# Patient Record
Sex: MALE | Race: WHITE | NOT HISPANIC OR LATINO
[De-identification: names, ages, dates, MRNs, and addresses within clinical notes are randomized per-mention and may not be internally consistent; named-entity substitution may affect disease eponyms.]

---

## 2019-01-09 ENCOUNTER — FORM ENCOUNTER (OUTPATIENT)
Age: 69
End: 2019-01-09

## 2019-01-10 ENCOUNTER — OUTPATIENT (OUTPATIENT)
Dept: OUTPATIENT SERVICES | Facility: HOSPITAL | Age: 69
LOS: 1 days | End: 2019-01-10
Payer: MEDICARE

## 2019-01-10 ENCOUNTER — APPOINTMENT (OUTPATIENT)
Dept: ORTHOPEDIC SURGERY | Facility: CLINIC | Age: 69
End: 2019-01-10
Payer: MEDICARE

## 2019-01-10 VITALS
BODY MASS INDEX: 35.82 KG/M2 | DIASTOLIC BLOOD PRESSURE: 70 MMHG | HEIGHT: 65 IN | WEIGHT: 215 LBS | OXYGEN SATURATION: 98 % | SYSTOLIC BLOOD PRESSURE: 140 MMHG | HEART RATE: 90 BPM

## 2019-01-10 DIAGNOSIS — I61.9 NONTRAUMATIC INTRACEREBRAL HEMORRHAGE, UNSPECIFIED: ICD-10-CM

## 2019-01-10 DIAGNOSIS — Z85.46 PERSONAL HISTORY OF MALIGNANT NEOPLASM OF PROSTATE: ICD-10-CM

## 2019-01-10 DIAGNOSIS — Z01.818 ENCOUNTER FOR OTHER PREPROCEDURAL EXAMINATION: ICD-10-CM

## 2019-01-10 DIAGNOSIS — Z78.9 OTHER SPECIFIED HEALTH STATUS: ICD-10-CM

## 2019-01-10 LAB
ANION GAP SERPL CALC-SCNC: 17 MMOL/L — SIGNIFICANT CHANGE UP (ref 5–17)
APPEARANCE UR: CLEAR — SIGNIFICANT CHANGE UP
APTT BLD: 31.6 SEC — SIGNIFICANT CHANGE UP (ref 27.5–36.3)
BACTERIA # UR AUTO: PRESENT /HPF
BILIRUB UR-MCNC: NEGATIVE — SIGNIFICANT CHANGE UP
BUN SERPL-MCNC: 15 MG/DL — SIGNIFICANT CHANGE UP (ref 7–23)
CALCIUM SERPL-MCNC: 9.7 MG/DL — SIGNIFICANT CHANGE UP (ref 8.4–10.5)
CHLORIDE SERPL-SCNC: 97 MMOL/L — SIGNIFICANT CHANGE UP (ref 96–108)
CO2 SERPL-SCNC: 25 MMOL/L — SIGNIFICANT CHANGE UP (ref 22–31)
COLOR SPEC: YELLOW — SIGNIFICANT CHANGE UP
COMMENT - URINE: SIGNIFICANT CHANGE UP
CREAT SERPL-MCNC: 0.73 MG/DL — SIGNIFICANT CHANGE UP (ref 0.5–1.3)
DIFF PNL FLD: NEGATIVE — SIGNIFICANT CHANGE UP
EPI CELLS # UR: SIGNIFICANT CHANGE UP /HPF (ref 0–5)
GLUCOSE SERPL-MCNC: 105 MG/DL — HIGH (ref 70–99)
GLUCOSE UR QL: NEGATIVE — SIGNIFICANT CHANGE UP
HBA1C BLD-MCNC: 5 % — SIGNIFICANT CHANGE UP (ref 4–5.6)
HCT VFR BLD CALC: 46.4 % — SIGNIFICANT CHANGE UP (ref 39–50)
HGB BLD-MCNC: 15.9 G/DL — SIGNIFICANT CHANGE UP (ref 13–17)
INR BLD: 0.99 — SIGNIFICANT CHANGE UP (ref 0.88–1.16)
KETONES UR-MCNC: 15 MG/DL
LEUKOCYTE ESTERASE UR-ACNC: ABNORMAL
MCHC RBC-ENTMCNC: 29.6 PG — SIGNIFICANT CHANGE UP (ref 27–34)
MCHC RBC-ENTMCNC: 34.3 G/DL — SIGNIFICANT CHANGE UP (ref 32–36)
MCV RBC AUTO: 86.4 FL — SIGNIFICANT CHANGE UP (ref 80–100)
NITRITE UR-MCNC: NEGATIVE — SIGNIFICANT CHANGE UP
PH UR: 6 — SIGNIFICANT CHANGE UP (ref 5–8)
PLATELET # BLD AUTO: 263 K/UL — SIGNIFICANT CHANGE UP (ref 150–400)
POTASSIUM SERPL-MCNC: 4.9 MMOL/L — SIGNIFICANT CHANGE UP (ref 3.5–5.3)
POTASSIUM SERPL-SCNC: 4.9 MMOL/L — SIGNIFICANT CHANGE UP (ref 3.5–5.3)
PROT UR-MCNC: NEGATIVE MG/DL — SIGNIFICANT CHANGE UP
PROTHROM AB SERPL-ACNC: 11.2 SEC — SIGNIFICANT CHANGE UP (ref 10–12.9)
RBC # BLD: 5.37 M/UL — SIGNIFICANT CHANGE UP (ref 4.2–5.8)
RBC # FLD: 13.5 % — SIGNIFICANT CHANGE UP (ref 10.3–16.9)
RBC CASTS # UR COMP ASSIST: < 5 /HPF — SIGNIFICANT CHANGE UP
SODIUM SERPL-SCNC: 139 MMOL/L — SIGNIFICANT CHANGE UP (ref 135–145)
SP GR SPEC: 1.02 — SIGNIFICANT CHANGE UP (ref 1–1.03)
UROBILINOGEN FLD QL: 0.2 E.U./DL — SIGNIFICANT CHANGE UP
WBC # BLD: 8.2 K/UL — SIGNIFICANT CHANGE UP (ref 3.8–10.5)
WBC # FLD AUTO: 8.2 K/UL — SIGNIFICANT CHANGE UP (ref 3.8–10.5)
WBC UR QL: ABNORMAL /HPF

## 2019-01-10 PROCEDURE — 72170 X-RAY EXAM OF PELVIS: CPT | Mod: 26

## 2019-01-10 PROCEDURE — 73564 X-RAY EXAM KNEE 4 OR MORE: CPT

## 2019-01-10 PROCEDURE — 71046 X-RAY EXAM CHEST 2 VIEWS: CPT

## 2019-01-10 PROCEDURE — 71046 X-RAY EXAM CHEST 2 VIEWS: CPT | Mod: 26

## 2019-01-10 PROCEDURE — 85027 COMPLETE CBC AUTOMATED: CPT

## 2019-01-10 PROCEDURE — 87086 URINE CULTURE/COLONY COUNT: CPT

## 2019-01-10 PROCEDURE — 93005 ELECTROCARDIOGRAM TRACING: CPT

## 2019-01-10 PROCEDURE — 80048 BASIC METABOLIC PNL TOTAL CA: CPT

## 2019-01-10 PROCEDURE — 81001 URINALYSIS AUTO W/SCOPE: CPT

## 2019-01-10 PROCEDURE — 85610 PROTHROMBIN TIME: CPT

## 2019-01-10 PROCEDURE — 73564 X-RAY EXAM KNEE 4 OR MORE: CPT | Mod: 26,50

## 2019-01-10 PROCEDURE — 85730 THROMBOPLASTIN TIME PARTIAL: CPT

## 2019-01-10 PROCEDURE — 99204 OFFICE O/P NEW MOD 45 MIN: CPT

## 2019-01-10 PROCEDURE — 83036 HEMOGLOBIN GLYCOSYLATED A1C: CPT

## 2019-01-10 PROCEDURE — 93010 ELECTROCARDIOGRAM REPORT: CPT

## 2019-01-10 PROCEDURE — 72170 X-RAY EXAM OF PELVIS: CPT

## 2019-01-10 RX ORDER — IBUPROFEN 800 MG/1
TABLET, FILM COATED ORAL
Refills: 0 | Status: ACTIVE | COMMUNITY

## 2019-01-10 RX ORDER — LISINOPRIL 30 MG/1
TABLET ORAL
Refills: 0 | Status: ACTIVE | COMMUNITY

## 2019-01-10 RX ORDER — CLONAZEPAM 2 MG/1
TABLET ORAL
Refills: 0 | Status: ACTIVE | COMMUNITY

## 2019-01-10 NOTE — HISTORY OF PRESENT ILLNESS
[Worsening] : worsening [___ yrs] : [unfilled] year(s) ago [Standing] : standing [Constant] : ~He/She~ states the symptoms seem to be constant [Bending] : worsened by bending [Sitting] : worsened by sitting [Walking] : worsened by walking [None] : No relieving factors are noted [de-identified] : 69 y/o M with HTN presents today for bilateral knee pain, pain in right > left. He reports that his right knee pain is severe, began in 2015, and is exacerbated by walking and stairs He is able to walk less than 5 blocks and up and down stairs with a rail. He reports mild bilateral knee stiffness and feeling unstable on uneven ground. Patient uses a cane to ambulate. He has tried Naproxen with no relief, acetaminophen with minimal relief and ibuprofen with moderate relief from pain. He reports that physical therapy made his symptoms worse and that he's had cortisone and gel injections which didn't help either. \par Of note, patient had some bleeding in the brain a year ago but reports that doctors at [facility] informed him it was no tumor. He was told to avoid aspirin for several months and then was told he could take it again by patient report.  He also has a PMHx of kidney and prostate cancer and was treated with surgery and radiation. [Acetaminophen] : not relieved by acetaminophen [NSAIDs] : not relieved by nonsteroidal anti-inflammatory drugs [Physical Therapy] : not relieved by physical therapy

## 2019-01-10 NOTE — PHYSICAL EXAM
[de-identified] : Constitutional: Well appearing. No acute distress.\par Mental Status: Alert & oriented to person, place and time. Normal affect.\par Pulmonary: No respiratory distress. Normal chest excursion.\par \par Gait: Antalgic\par Ambulatory assist devices: Cane.\par \par Cervical spine: Skin intact. No visible deformity. Painless active ROM without evident restriction.\par Bilateral upper extremities: Skin intact. No deformity. Painless active ROM without evident restriction.\par Thoracolumbar spine: No deformity. No tenderness. No radicular pain on passive straight leg raise bilaterally.\par Pelvis: No pelvic obliquity. No tenderness.\par Leg lengths: Similar \par \par Bilateral Hips: No swelling or deformity. No focal tenderness. Painless and unrestricted range of motion. \par \par Right Knee:\par Skin intact.\par No surgical scars.\par No erythema or ecchymosis.\par No swelling or effusion.\par Varus deformity.\par Medial joint line tenderness.\par Painless and restricted range of motion. ROM from 5-10 degrees to 120 degrees of flexion.\par Central patellar tracking.\par (+) crepitation.\par No instability.\par 2+ pedal pulse\par \par Left Knee:\par Skin intact.\par No surgical scars.\par No erythema or ecchymosis.\par No swelling or effusion.\par Slight, correctable varus deformity.\par Minimal tenderness.\par Painless and restricted range of motion. ROM from <5 degrees to 120 degrees of flexion.\par Central patellar tracking.\par (+) crepitation.\par No instability.\par 1+ edema at ankle level\par \par Neurological: Intact distal crude touch sensation. Normal distal motor power. \par Cardiovascular: Palpable dorsalis pedis and posterior tibialis pulses. Brisk capillary refill. No peripheral edema.\par Lymphatics: No peripheral adenopathy appreciated. [de-identified] : X-ray imaging of the bilateral knees taken here today shows severe osteoarthritis of both knees, bone on bone in medial tibiofemoral compartment, more severe on the right than the left, significant patellofemoral involvement, bone on bone bilaterally, and lesser degenerative changes in the lateral compartment bilaterally.\par Imaging of the AP pelvis showed minimal arthritis of the hips. \par \par \par \par

## 2019-01-10 NOTE — ADDENDUM
[FreeTextEntry1] : This note was written by Rosalee Luong on 01/10/2019 acting as scribe for Dr. Coker and NAIMA Navarro.\par \par \par \par

## 2019-01-12 LAB
ANABASINE UR-MCNC: <2 NG/ML — SIGNIFICANT CHANGE UP
COTININE UR-MCNC: <5 NG/ML — SIGNIFICANT CHANGE UP
CULTURE RESULTS: SIGNIFICANT CHANGE UP
NICOTINE UR-MCNC: <5 NG/ML — SIGNIFICANT CHANGE UP
NORNICOTINE UR-MCNC: <2 NG/ML — SIGNIFICANT CHANGE UP
SPECIMEN SOURCE: SIGNIFICANT CHANGE UP

## 2019-01-15 ENCOUNTER — FORM ENCOUNTER (OUTPATIENT)
Age: 69
End: 2019-01-15

## 2019-01-16 ENCOUNTER — OUTPATIENT (OUTPATIENT)
Dept: OUTPATIENT SERVICES | Facility: HOSPITAL | Age: 69
LOS: 1 days | End: 2019-01-16
Payer: MEDICARE

## 2019-01-16 ENCOUNTER — OUTPATIENT (OUTPATIENT)
Dept: OUTPATIENT SERVICES | Facility: HOSPITAL | Age: 69
LOS: 1 days | End: 2019-01-16
Payer: COMMERCIAL

## 2019-01-16 ENCOUNTER — APPOINTMENT (OUTPATIENT)
Dept: CT IMAGING | Facility: HOSPITAL | Age: 69
End: 2019-01-16
Payer: MEDICARE

## 2019-01-16 DIAGNOSIS — Z22.321 CARRIER OR SUSPECTED CARRIER OF METHICILLIN SUSCEPTIBLE STAPHYLOCOCCUS AUREUS: ICD-10-CM

## 2019-01-16 LAB
MRSA PCR RESULT.: NEGATIVE — SIGNIFICANT CHANGE UP
S AUREUS DNA NOSE QL NAA+PROBE: NEGATIVE — SIGNIFICANT CHANGE UP

## 2019-01-16 PROCEDURE — 87641 MR-STAPH DNA AMP PROBE: CPT

## 2019-01-16 PROCEDURE — 73700 CT LOWER EXTREMITY W/O DYE: CPT

## 2019-01-16 PROCEDURE — 73700 CT LOWER EXTREMITY W/O DYE: CPT | Mod: 26,RT

## 2019-01-27 ENCOUNTER — TRANSCRIPTION ENCOUNTER (OUTPATIENT)
Age: 69
End: 2019-01-27

## 2019-01-29 ENCOUNTER — APPOINTMENT (OUTPATIENT)
Dept: INTERNAL MEDICINE | Facility: CLINIC | Age: 69
End: 2019-01-29
Payer: MEDICARE

## 2019-01-29 VITALS
DIASTOLIC BLOOD PRESSURE: 89 MMHG | TEMPERATURE: 97.9 F | SYSTOLIC BLOOD PRESSURE: 130 MMHG | WEIGHT: 220 LBS | HEART RATE: 89 BPM | RESPIRATION RATE: 14 BRPM | OXYGEN SATURATION: 97 % | BODY MASS INDEX: 36.61 KG/M2

## 2019-01-29 DIAGNOSIS — Z85.528 PERSONAL HISTORY OF OTHER MALIGNANT NEOPLASM OF KIDNEY: ICD-10-CM

## 2019-01-29 DIAGNOSIS — Z00.00 ENCOUNTER FOR GENERAL ADULT MEDICAL EXAMINATION W/OUT ABNORMAL FINDINGS: ICD-10-CM

## 2019-01-29 PROCEDURE — 99204 OFFICE O/P NEW MOD 45 MIN: CPT

## 2019-01-29 RX ORDER — LISINOPRIL AND HYDROCHLOROTHIAZIDE TABLETS 20; 12.5 MG/1; MG/1
20-12.5 TABLET ORAL
Refills: 0 | Status: ACTIVE | COMMUNITY

## 2019-01-29 NOTE — REVIEW OF SYSTEMS
[Fever] : no fever [Chills] : no chills [Recent Change In Weight] : ~T recent weight change [Joint Pain] : joint pain [Insomnia] : insomnia [Negative] : Heme/Lymph

## 2019-01-29 NOTE — HISTORY OF PRESENT ILLNESS
[No Pertinent Cardiac History] : no history of aortic stenosis, atrial fibrillation, coronary artery disease, recent myocardial infarction, or implantable device/pacemaker [No Pertinent Pulmonary History] : no history of asthma, COPD, sleep apnea, or smoking [FreeTextEntry1] : right tkr [FreeTextEntry2] : 2/6/19 [FreeTextEntry3] : Dr Coker [FreeTextEntry4] : This is a 69 yo M with hx of HTN, prosate CA, Renal mass s/p partial nephrectomy\par Knee pain for 5 years - failed conservative therapy\par Hx of penile surgery- always with contaminated urine.\par Reports a hx of cerebellar hemorrhage- no trauma, resolved on its own\par Insomnia-  takes klonopin for this

## 2019-01-29 NOTE — PHYSICAL EXAM
[No Acute Distress] : no acute distress [Well Nourished] : well nourished [Well Developed] : well developed [Well-Appearing] : well-appearing [Normal Outer Ear/Nose] : the outer ears and nose were normal in appearance [Normal Oropharynx] : the oropharynx was normal [No JVD] : no jugular venous distention [Supple] : supple [No Lymphadenopathy] : no lymphadenopathy [Thyroid Normal, No Nodules] : the thyroid was normal and there were no nodules present [No Respiratory Distress] : no respiratory distress  [Clear to Auscultation] : lungs were clear to auscultation bilaterally [No Accessory Muscle Use] : no accessory muscle use [Normal Rate] : normal rate  [Regular Rhythm] : with a regular rhythm [Normal S1, S2] : normal S1 and S2 [No Edema] : there was no peripheral edema [Soft] : abdomen soft [Non Tender] : non-tender [Non-distended] : non-distended [No Masses] : no abdominal mass palpated [No HSM] : no HSM [Normal Bowel Sounds] : normal bowel sounds [Normal Posterior Cervical Nodes] : no posterior cervical lymphadenopathy [Normal Anterior Cervical Nodes] : no anterior cervical lymphadenopathy [No Rash] : no rash [Normal Gait] : normal gait [No Focal Deficits] : no focal deficits [Deep Tendon Reflexes (DTR)] : deep tendon reflexes were 2+ and symmetric [Normal Affect] : the affect was normal [Normal Insight/Judgement] : insight and judgment were intact [de-identified] : 3/6 suzanne

## 2019-02-01 VITALS
HEIGHT: 66 IN | WEIGHT: 221.79 LBS | TEMPERATURE: 98 F | DIASTOLIC BLOOD PRESSURE: 83 MMHG | OXYGEN SATURATION: 98 % | RESPIRATION RATE: 16 BRPM | SYSTOLIC BLOOD PRESSURE: 131 MMHG | HEART RATE: 84 BPM

## 2019-02-01 NOTE — H&P ADULT - NSHPLABSRESULTS_GEN_ALL_CORE
Preop cbc, bmp, pt/inr, ptt, ua wnl; nasal swab neg  preop cxr wnl per clearance  preop ekg sinus tach rate 101 per clearance

## 2019-02-01 NOTE — H&P ADULT - NSHPPHYSICALEXAM_GEN_ALL_CORE
Right knee decreased rom 2/2 pain  Rest of PE per MD clearance Gen: NAD  MSK: No deformity or open wounds. No rashes or lesions. EHL/TA/GS/FHL 5/5 BLE. Sensation intact and equal BLE. Skin warm and well perfused. DP palpable BLE. Right knee decreased rom 2/2 pain.  Rest of PE per MD clearance

## 2019-02-01 NOTE — H&P ADULT - HISTORY OF PRESENT ILLNESS
68M c/o right knee pain  Presents today for elective right robotic TKR. 68M c/o right knee pain x 5 years. The patient denies preceding accident/injury or trauma to the knee and states that his symptoms are due to arthritis. The patient states that he has been ambulating with a cane lately. He complains of pain localized to the right knee and denies radiation of pain or numbness/tingling in the lower extremities. The patient failed conservative therapies for his symptoms including physical therapy. The patient feels well today and denies fever/chills/chest pain/shortness of breath. Denies hx of DVT.    Presents today for elective right TKR, robotic assisted.

## 2019-02-01 NOTE — H&P ADULT - PMH
Heart Murmur    Hiatal Hernia    Malignant neoplasm of kidney    Nontraumatic intracerebral hemorrhage    OA (osteoarthritis)    Personal History of Hypertensi    Prostate cancer

## 2019-02-01 NOTE — PATIENT PROFILE ADULT - NSPROEDALEARNPREF_GEN_A_NUR
written material/verbal instruction/individual instruction verbal instruction/skill demonstration/individual instruction/written material

## 2019-02-02 LAB — BACTERIA THROAT CULT: NORMAL

## 2019-02-04 ENCOUNTER — RESULT REVIEW (OUTPATIENT)
Age: 69
End: 2019-02-04

## 2019-02-04 ENCOUNTER — APPOINTMENT (OUTPATIENT)
Dept: ORTHOPEDIC SURGERY | Facility: HOSPITAL | Age: 69
End: 2019-02-04

## 2019-02-04 ENCOUNTER — INPATIENT (INPATIENT)
Facility: HOSPITAL | Age: 69
LOS: 1 days | Discharge: HOME HEALTH | DRG: 470 | End: 2019-02-06
Attending: ORTHOPAEDIC SURGERY | Admitting: ORTHOPAEDIC SURGERY
Payer: MEDICARE

## 2019-02-04 ENCOUNTER — MEDICATION RENEWAL (OUTPATIENT)
Age: 69
End: 2019-02-04

## 2019-02-04 DIAGNOSIS — Z96.0 PRESENCE OF UROGENITAL IMPLANTS: Chronic | ICD-10-CM

## 2019-02-04 DIAGNOSIS — Z90.5 ACQUIRED ABSENCE OF KIDNEY: Chronic | ICD-10-CM

## 2019-02-04 DIAGNOSIS — M17.11 UNILATERAL PRIMARY OSTEOARTHRITIS, RIGHT KNEE: ICD-10-CM

## 2019-02-04 LAB
HCT VFR BLD CALC: 39.6 % — SIGNIFICANT CHANGE UP (ref 39–50)
HGB BLD-MCNC: 13.5 G/DL — SIGNIFICANT CHANGE UP (ref 13–17)
MCHC RBC-ENTMCNC: 29.7 PG — SIGNIFICANT CHANGE UP (ref 27–34)
MCHC RBC-ENTMCNC: 34.1 G/DL — SIGNIFICANT CHANGE UP (ref 32–36)
MCV RBC AUTO: 87 FL — SIGNIFICANT CHANGE UP (ref 80–100)
PLATELET # BLD AUTO: 208 K/UL — SIGNIFICANT CHANGE UP (ref 150–400)
RBC # BLD: 4.55 M/UL — SIGNIFICANT CHANGE UP (ref 4.2–5.8)
RBC # FLD: 13.6 % — SIGNIFICANT CHANGE UP (ref 10.3–16.9)
WBC # BLD: 6.4 K/UL — SIGNIFICANT CHANGE UP (ref 3.8–10.5)
WBC # FLD AUTO: 6.4 K/UL — SIGNIFICANT CHANGE UP (ref 3.8–10.5)

## 2019-02-04 PROCEDURE — 27447 TOTAL KNEE ARTHROPLASTY: CPT | Mod: RT

## 2019-02-04 PROCEDURE — 73560 X-RAY EXAM OF KNEE 1 OR 2: CPT | Mod: 26,RT

## 2019-02-04 PROCEDURE — 20985 CPTR-ASST DIR MS PX: CPT

## 2019-02-04 RX ORDER — HYDROMORPHONE HYDROCHLORIDE 2 MG/ML
0.5 INJECTION INTRAMUSCULAR; INTRAVENOUS; SUBCUTANEOUS
Qty: 0 | Refills: 0 | Status: DISCONTINUED | OUTPATIENT
Start: 2019-02-04 | End: 2019-02-06

## 2019-02-04 RX ORDER — OXYCODONE HYDROCHLORIDE 5 MG/1
10 TABLET ORAL EVERY 4 HOURS
Qty: 0 | Refills: 0 | Status: DISCONTINUED | OUTPATIENT
Start: 2019-02-04 | End: 2019-02-06

## 2019-02-04 RX ORDER — HYDROCHLOROTHIAZIDE 25 MG
12.5 TABLET ORAL DAILY
Qty: 0 | Refills: 0 | Status: DISCONTINUED | OUTPATIENT
Start: 2019-02-04 | End: 2019-02-06

## 2019-02-04 RX ORDER — DOCUSATE SODIUM 100 MG
100 CAPSULE ORAL THREE TIMES A DAY
Qty: 0 | Refills: 0 | Status: DISCONTINUED | OUTPATIENT
Start: 2019-02-04 | End: 2019-02-06

## 2019-02-04 RX ORDER — CLONAZEPAM 1 MG
0.5 TABLET ORAL DAILY
Qty: 0 | Refills: 0 | Status: DISCONTINUED | OUTPATIENT
Start: 2019-02-04 | End: 2019-02-06

## 2019-02-04 RX ORDER — MAGNESIUM HYDROXIDE 400 MG/1
30 TABLET, CHEWABLE ORAL DAILY
Qty: 0 | Refills: 0 | Status: DISCONTINUED | OUTPATIENT
Start: 2019-02-04 | End: 2019-02-06

## 2019-02-04 RX ORDER — CEFAZOLIN SODIUM 1 G
2000 VIAL (EA) INJECTION EVERY 8 HOURS
Qty: 0 | Refills: 0 | Status: COMPLETED | OUTPATIENT
Start: 2019-02-04 | End: 2019-02-05

## 2019-02-04 RX ORDER — RALTEGRAVIR 400 MG/1
400 TABLET, FILM COATED ORAL
Qty: 0 | Refills: 0 | Status: DISCONTINUED | OUTPATIENT
Start: 2019-02-04 | End: 2019-02-04

## 2019-02-04 RX ORDER — ONDANSETRON 8 MG/1
4 TABLET, FILM COATED ORAL EVERY 6 HOURS
Qty: 0 | Refills: 0 | Status: DISCONTINUED | OUTPATIENT
Start: 2019-02-04 | End: 2019-02-06

## 2019-02-04 RX ORDER — SODIUM CHLORIDE 9 MG/ML
1000 INJECTION, SOLUTION INTRAVENOUS
Qty: 0 | Refills: 0 | Status: DISCONTINUED | OUTPATIENT
Start: 2019-02-04 | End: 2019-02-06

## 2019-02-04 RX ORDER — MORPHINE SULFATE 50 MG/1
2 CAPSULE, EXTENDED RELEASE ORAL EVERY 4 HOURS
Qty: 0 | Refills: 0 | Status: DISCONTINUED | OUTPATIENT
Start: 2019-02-04 | End: 2019-02-04

## 2019-02-04 RX ORDER — PANTOPRAZOLE SODIUM 20 MG/1
40 TABLET, DELAYED RELEASE ORAL
Qty: 0 | Refills: 0 | Status: DISCONTINUED | OUTPATIENT
Start: 2019-02-04 | End: 2019-02-06

## 2019-02-04 RX ORDER — ASPIRIN/CALCIUM CARB/MAGNESIUM 324 MG
325 TABLET ORAL
Qty: 0 | Refills: 0 | Status: DISCONTINUED | OUTPATIENT
Start: 2019-02-04 | End: 2019-02-06

## 2019-02-04 RX ORDER — CELECOXIB 200 MG/1
400 CAPSULE ORAL ONCE
Qty: 0 | Refills: 0 | Status: COMPLETED | OUTPATIENT
Start: 2019-02-04 | End: 2019-02-04

## 2019-02-04 RX ORDER — HYDROMORPHONE HYDROCHLORIDE 2 MG/ML
0.25 INJECTION INTRAMUSCULAR; INTRAVENOUS; SUBCUTANEOUS EVERY 4 HOURS
Qty: 0 | Refills: 0 | Status: DISCONTINUED | OUTPATIENT
Start: 2019-02-04 | End: 2019-02-06

## 2019-02-04 RX ORDER — BUPIVACAINE 13.3 MG/ML
20 INJECTION, SUSPENSION, LIPOSOMAL INFILTRATION ONCE
Qty: 0 | Refills: 0 | Status: DISCONTINUED | OUTPATIENT
Start: 2019-02-04 | End: 2019-02-06

## 2019-02-04 RX ORDER — CELECOXIB 200 MG/1
200 CAPSULE ORAL EVERY 12 HOURS
Qty: 0 | Refills: 0 | Status: DISCONTINUED | OUTPATIENT
Start: 2019-02-06 | End: 2019-02-06

## 2019-02-04 RX ORDER — OXYCODONE HYDROCHLORIDE 5 MG/1
10 TABLET ORAL ONCE
Qty: 0 | Refills: 0 | Status: DISCONTINUED | OUTPATIENT
Start: 2019-02-04 | End: 2019-02-04

## 2019-02-04 RX ORDER — OXYCODONE HYDROCHLORIDE 5 MG/1
5 TABLET ORAL EVERY 4 HOURS
Qty: 0 | Refills: 0 | Status: DISCONTINUED | OUTPATIENT
Start: 2019-02-04 | End: 2019-02-06

## 2019-02-04 RX ORDER — SENNA PLUS 8.6 MG/1
2 TABLET ORAL AT BEDTIME
Qty: 0 | Refills: 0 | Status: DISCONTINUED | OUTPATIENT
Start: 2019-02-04 | End: 2019-02-06

## 2019-02-04 RX ORDER — ACETAMINOPHEN 500 MG
650 TABLET ORAL EVERY 6 HOURS
Qty: 0 | Refills: 0 | Status: DISCONTINUED | OUTPATIENT
Start: 2019-02-04 | End: 2019-02-06

## 2019-02-04 RX ORDER — ACETAMINOPHEN 500 MG
1000 TABLET ORAL ONCE
Qty: 0 | Refills: 0 | Status: COMPLETED | OUTPATIENT
Start: 2019-02-04 | End: 2019-02-04

## 2019-02-04 RX ORDER — LISINOPRIL 2.5 MG/1
20 TABLET ORAL DAILY
Qty: 0 | Refills: 0 | Status: DISCONTINUED | OUTPATIENT
Start: 2019-02-04 | End: 2019-02-06

## 2019-02-04 RX ORDER — POLYETHYLENE GLYCOL 3350 17 G/17G
17 POWDER, FOR SOLUTION ORAL DAILY
Qty: 0 | Refills: 0 | Status: DISCONTINUED | OUTPATIENT
Start: 2019-02-04 | End: 2019-02-06

## 2019-02-04 RX ORDER — KETOROLAC TROMETHAMINE 30 MG/ML
15 SYRINGE (ML) INJECTION EVERY 6 HOURS
Qty: 0 | Refills: 0 | Status: DISCONTINUED | OUTPATIENT
Start: 2019-02-04 | End: 2019-02-06

## 2019-02-04 RX ORDER — MORPHINE SULFATE 50 MG/1
4 CAPSULE, EXTENDED RELEASE ORAL
Qty: 0 | Refills: 0 | Status: DISCONTINUED | OUTPATIENT
Start: 2019-02-04 | End: 2019-02-04

## 2019-02-04 RX ORDER — CEFAZOLIN SODIUM 1 G
2000 VIAL (EA) INJECTION EVERY 8 HOURS
Qty: 0 | Refills: 0 | Status: DISCONTINUED | OUTPATIENT
Start: 2019-02-04 | End: 2019-02-04

## 2019-02-04 RX ADMIN — SODIUM CHLORIDE 100 MILLILITER(S): 9 INJECTION, SOLUTION INTRAVENOUS at 16:41

## 2019-02-04 RX ADMIN — Medication 15 MILLIGRAM(S): at 17:34

## 2019-02-04 RX ADMIN — CELECOXIB 400 MILLIGRAM(S): 200 CAPSULE ORAL at 09:23

## 2019-02-04 RX ADMIN — Medication 2000 MILLIGRAM(S): at 17:34

## 2019-02-04 RX ADMIN — Medication 1000 MILLIGRAM(S): at 09:23

## 2019-02-04 RX ADMIN — Medication 100 MILLIGRAM(S): at 17:58

## 2019-02-04 RX ADMIN — PANTOPRAZOLE SODIUM 40 MILLIGRAM(S): 20 TABLET, DELAYED RELEASE ORAL at 20:56

## 2019-02-04 RX ADMIN — Medication 12.5 MILLIGRAM(S): at 20:56

## 2019-02-04 RX ADMIN — Medication 325 MILLIGRAM(S): at 17:34

## 2019-02-04 RX ADMIN — Medication 650 MILLIGRAM(S): at 18:04

## 2019-02-04 RX ADMIN — Medication 650 MILLIGRAM(S): at 17:34

## 2019-02-04 RX ADMIN — HYDROMORPHONE HYDROCHLORIDE 0.5 MILLIGRAM(S): 2 INJECTION INTRAMUSCULAR; INTRAVENOUS; SUBCUTANEOUS at 15:26

## 2019-02-04 RX ADMIN — Medication 15 MILLIGRAM(S): at 18:04

## 2019-02-04 RX ADMIN — OXYCODONE HYDROCHLORIDE 5 MILLIGRAM(S): 5 TABLET ORAL at 19:56

## 2019-02-04 RX ADMIN — LISINOPRIL 20 MILLIGRAM(S): 2.5 TABLET ORAL at 20:56

## 2019-02-04 RX ADMIN — HYDROMORPHONE HYDROCHLORIDE 0.5 MILLIGRAM(S): 2 INJECTION INTRAMUSCULAR; INTRAVENOUS; SUBCUTANEOUS at 16:40

## 2019-02-04 RX ADMIN — OXYCODONE HYDROCHLORIDE 5 MILLIGRAM(S): 5 TABLET ORAL at 20:40

## 2019-02-04 RX ADMIN — OXYCODONE HYDROCHLORIDE 10 MILLIGRAM(S): 5 TABLET ORAL at 09:23

## 2019-02-04 NOTE — PROGRESS NOTE ADULT - SUBJECTIVE AND OBJECTIVE BOX
Ortho Post Op Check    Procedure:  Right total knee arthroplasty  Surgeon:  Dr. Coker    Pt comfortable complaining of mild incisional pain as well as some acid reflux.  Pt denies CP, SOB, abdominal pain, N/V, numbness/tingling down lower extremities b/l     Vital Signs Last 24 Hrs  T(C): 36.4 (02-04-19 @ 14:56), Max: 36.4 (02-04-19 @ 14:56)  T(F): 97.6 (02-04-19 @ 14:56), Max: 97.6 (02-04-19 @ 14:56)  HR: 56 (02-04-19 @ 14:56) (56 - 92)  BP: 135/62 (02-04-19 @ 14:56) (117/65 - 135/87)  BP(mean): 104 (02-04-19 @ 14:52) (86 - 104)  RR: 16 (02-04-19 @ 14:56) (13 - 22)  SpO2: 99% (02-04-19 @ 14:56) (90% - 99%)      General: Pt Alert and oriented, NAD  DSG ace wrap and cryocuff RLE C/D/I  Pulses: DP's  +2 b/l, PT's +2 b/l   Sensation: gross sensation to light touch intact throughout lower extremities b/l  Motor: EHL/FHL/TA/GS 5/5 b/l                          13.5   6.4   )-----------( 208      ( 04 Feb 2019 13:51 )             39.6           Post-op X-Ray:  Prosthesis in place no evidence of fracture.     A/P: 68yMale POD#0 s/p Right TKA  - Stable  - Pain Control as needed  - DVT ppx:  ASA   - Post op abx: ancef  - PT/OOB, WBS: WBAT  - Protonix for GERD  - Dispo: admit to regional     Ortho Pager [phone]

## 2019-02-04 NOTE — PHYSICAL THERAPY INITIAL EVALUATION ADULT - CRITERIA FOR SKILLED THERAPEUTIC INTERVENTIONS
impairments found/rehab potential/anticipated equipment needs at discharge/therapy frequency/anticipated discharge recommendation

## 2019-02-04 NOTE — BRIEF OPERATIVE NOTE - PROCEDURE
<<-----Click on this checkbox to enter Procedure Total knee arthroplasty  02/04/2019  Right total knee arthroplasty w/ robotic assisted  Active  VERNA

## 2019-02-05 ENCOUNTER — TRANSCRIPTION ENCOUNTER (OUTPATIENT)
Age: 69
End: 2019-02-05

## 2019-02-05 LAB
ANION GAP SERPL CALC-SCNC: 9 MMOL/L — SIGNIFICANT CHANGE UP (ref 5–17)
BUN SERPL-MCNC: 11 MG/DL — SIGNIFICANT CHANGE UP (ref 7–23)
CALCIUM SERPL-MCNC: 9.4 MG/DL — SIGNIFICANT CHANGE UP (ref 8.4–10.5)
CHLORIDE SERPL-SCNC: 102 MMOL/L — SIGNIFICANT CHANGE UP (ref 96–108)
CO2 SERPL-SCNC: 26 MMOL/L — SIGNIFICANT CHANGE UP (ref 22–31)
CREAT SERPL-MCNC: 0.59 MG/DL — SIGNIFICANT CHANGE UP (ref 0.5–1.3)
GLUCOSE SERPL-MCNC: 97 MG/DL — SIGNIFICANT CHANGE UP (ref 70–99)
HCT VFR BLD CALC: 39.8 % — SIGNIFICANT CHANGE UP (ref 39–50)
HGB BLD-MCNC: 13.3 G/DL — SIGNIFICANT CHANGE UP (ref 13–17)
MCHC RBC-ENTMCNC: 29.2 PG — SIGNIFICANT CHANGE UP (ref 27–34)
MCHC RBC-ENTMCNC: 33.4 G/DL — SIGNIFICANT CHANGE UP (ref 32–36)
MCV RBC AUTO: 87.3 FL — SIGNIFICANT CHANGE UP (ref 80–100)
PLATELET # BLD AUTO: 214 K/UL — SIGNIFICANT CHANGE UP (ref 150–400)
POTASSIUM SERPL-MCNC: 4.6 MMOL/L — SIGNIFICANT CHANGE UP (ref 3.5–5.3)
POTASSIUM SERPL-SCNC: 4.6 MMOL/L — SIGNIFICANT CHANGE UP (ref 3.5–5.3)
RBC # BLD: 4.56 M/UL — SIGNIFICANT CHANGE UP (ref 4.2–5.8)
RBC # FLD: 13.6 % — SIGNIFICANT CHANGE UP (ref 10.3–16.9)
SODIUM SERPL-SCNC: 137 MMOL/L — SIGNIFICANT CHANGE UP (ref 135–145)
WBC # BLD: 12.8 K/UL — HIGH (ref 3.8–10.5)
WBC # FLD AUTO: 12.8 K/UL — HIGH (ref 3.8–10.5)

## 2019-02-05 PROCEDURE — 99233 SBSQ HOSP IP/OBS HIGH 50: CPT

## 2019-02-05 RX ADMIN — OXYCODONE HYDROCHLORIDE 10 MILLIGRAM(S): 5 TABLET ORAL at 15:20

## 2019-02-05 RX ADMIN — POLYETHYLENE GLYCOL 3350 17 GRAM(S): 17 POWDER, FOR SOLUTION ORAL at 12:37

## 2019-02-05 RX ADMIN — Medication 15 MILLIGRAM(S): at 07:01

## 2019-02-05 RX ADMIN — OXYCODONE HYDROCHLORIDE 10 MILLIGRAM(S): 5 TABLET ORAL at 14:24

## 2019-02-05 RX ADMIN — OXYCODONE HYDROCHLORIDE 10 MILLIGRAM(S): 5 TABLET ORAL at 10:50

## 2019-02-05 RX ADMIN — Medication 650 MILLIGRAM(S): at 06:01

## 2019-02-05 RX ADMIN — Medication 650 MILLIGRAM(S): at 18:02

## 2019-02-05 RX ADMIN — Medication 15 MILLIGRAM(S): at 12:37

## 2019-02-05 RX ADMIN — Medication 100 MILLIGRAM(S): at 12:37

## 2019-02-05 RX ADMIN — OXYCODONE HYDROCHLORIDE 5 MILLIGRAM(S): 5 TABLET ORAL at 06:01

## 2019-02-05 RX ADMIN — Medication 650 MILLIGRAM(S): at 12:37

## 2019-02-05 RX ADMIN — Medication 2000 MILLIGRAM(S): at 00:08

## 2019-02-05 RX ADMIN — Medication 325 MILLIGRAM(S): at 06:01

## 2019-02-05 RX ADMIN — OXYCODONE HYDROCHLORIDE 10 MILLIGRAM(S): 5 TABLET ORAL at 09:57

## 2019-02-05 RX ADMIN — Medication 325 MILLIGRAM(S): at 18:02

## 2019-02-05 RX ADMIN — Medication 650 MILLIGRAM(S): at 07:01

## 2019-02-05 RX ADMIN — OXYCODONE HYDROCHLORIDE 5 MILLIGRAM(S): 5 TABLET ORAL at 07:01

## 2019-02-05 RX ADMIN — Medication 100 MILLIGRAM(S): at 21:16

## 2019-02-05 RX ADMIN — OXYCODONE HYDROCHLORIDE 5 MILLIGRAM(S): 5 TABLET ORAL at 00:10

## 2019-02-05 RX ADMIN — Medication 15 MILLIGRAM(S): at 18:20

## 2019-02-05 RX ADMIN — Medication 15 MILLIGRAM(S): at 18:02

## 2019-02-05 RX ADMIN — Medication 15 MILLIGRAM(S): at 06:01

## 2019-02-05 RX ADMIN — Medication 650 MILLIGRAM(S): at 19:00

## 2019-02-05 RX ADMIN — Medication 15 MILLIGRAM(S): at 12:55

## 2019-02-05 RX ADMIN — OXYCODONE HYDROCHLORIDE 5 MILLIGRAM(S): 5 TABLET ORAL at 01:10

## 2019-02-05 RX ADMIN — Medication 650 MILLIGRAM(S): at 13:30

## 2019-02-05 NOTE — DISCHARGE NOTE ADULT - PATIENT PORTAL LINK FT
You can access the [facility] Patient Portal, offered by [facility], by registering with the following website: [email or web address]

## 2019-02-05 NOTE — PROGRESS NOTE ADULT - ASSESSMENT
A/P: 68yMale POD1 s/p R TKA  - Stable overnight  - Pain/Nausea Control adequate  - Home meds  - DVT ppx:  BID  - WBS: WBAT RLE  - PT: anticipating home w HPT dispo  - Pending PT clearance      Ortho Pager [phone]

## 2019-02-05 NOTE — CONSULT NOTE ADULT - SUBJECTIVE AND OBJECTIVE BOX
Patient seen and examined, Chart reviewed    HPI:  68M wit hx of HTN, DJD, with  c/o right knee pain x 5 years. The patient denies preceding accident/injury or trauma to the knee and states that his symptoms are due to arthritis. The patient states that he has been ambulating with a cane lately. He complains of pain localized to the right knee and denies radiation of pain or numbness/tingling in the lower extremities. The patient failed conservative therapies for his symptoms including physical therapy. The patient feels well today and denies fever/chills/chest pain/shortness of breath. Denies hx of DVT.    He is today post op day #1 from an elective right TKR.    PAST MEDICAL & SURGICAL HISTORY:  Nontraumatic intracerebral hemorrhage  Malignant neoplasm of kidney  Prostate cancer  OA (osteoarthritis)  Heart Murmur  Hiatal Hernia  Personal History of Hypertensi  History of penile implant: denies  H/O partial nephrectomy: left      REVIEW OF SYSTEMS:  CONSTITUTIONAL:  No night sweats.  No fatigue,  No fever or chills.  HEENT:  Eyes:  No visual changes.    RESPIRATORY:  No cough.  No wheeze.    No shortness of breath.  CARDIOVASCULAR:  No chest pains.  No palpitations.  GASTROINTESTINAL:  No abdominal pain.  No nausea or vomiting.  No diarrhea    GENITOURINARY:  No urgency.  No frequency.  No dysuria.    MUSCULOSKELETAL:  right knee pain and stiffness  SKIN:  No rashes.     acetaminophen   Tablet .. 650 milliGRAM(s) Oral every 6 hours  aluminum hydroxide/magnesium hydroxide/simethicone Suspension 30 milliLiter(s) Oral four times a day PRN  aspirin enteric coated 325 milliGRAM(s) Oral two times a day  BUpivacaine liposome 1.3% Injectable (no eMAR) 20 milliLiter(s) Local Injection once  clonazePAM Tablet 0.5 milliGRAM(s) Oral daily PRN  docusate sodium 100 milliGRAM(s) Oral three times a day  hydrochlorothiazide 12.5 milliGRAM(s) Oral daily  HYDROmorphone  Injectable 0.5 milliGRAM(s) IV Push every 15 minutes PRN  HYDROmorphone  Injectable 0.25 milliGRAM(s) IV Push every 4 hours PRN  ketorolac   Injectable 15 milliGRAM(s) IV Push every 6 hours  lactated ringers. 1000 milliLiter(s) IV Continuous <Continuous>  lisinopril 20 milliGRAM(s) Oral daily  magnesium hydroxide Suspension 30 milliLiter(s) Oral daily PRN  ondansetron Injectable 4 milliGRAM(s) IV Push every 6 hours PRN  oxyCODONE    IR 5 milliGRAM(s) Oral every 4 hours PRN  oxyCODONE    IR 10 milliGRAM(s) Oral every 4 hours PRN  pantoprazole    Tablet 40 milliGRAM(s) Oral before breakfast  polyethylene glycol 3350 17 Gram(s) Oral daily  senna 2 Tablet(s) Oral at bedtime PRN      Allergies    eggplant- coughing (Other)  No Known Drug Allergies      SOCIAL HISTORY: no tob    FAMILY HISTORY:  nc    Vital Signs Last 24 Hrs  T(C): 36.2 (05 Feb 2019 04:52), Max: 36.4 (04 Feb 2019 14:56)  T(F): 97.1 (05 Feb 2019 04:52), Max: 97.6 (04 Feb 2019 14:56)  HR: 79 (05 Feb 2019 04:52) (56 - 92)  BP: 120/72 (05 Feb 2019 04:52) (117/65 - 164/85)  BP(mean): 123 (04 Feb 2019 16:45) (86 - 123)  RR: 16 (05 Feb 2019 04:52) (13 - 22)  SpO2: 95% (05 Feb 2019 04:52) (90% - 99%)    02-04 @ 07:01  -  02-05 @ 07:00  --------------------------------------------------------  IN: 1700 mL / OUT: 1225 mL / NET: 475 mL        PHYSICAL EXAM:   General - NAD, Alert and oriented x 3,   Neck - - No lymphadenopathy  Cardiovascular - RRR no m/r/g, no JVD  Lungs - Clear to auscultation, no use of accessory muscles, no crackles or wheezes.  Skin - No rashes, skin warm and dry, no erythematous areas  Abdomen - Normal bowel sounds, abdomen soft and nontender  Extremeties - No edema,   Neurological – no focal deficits      LABS:                        13.5   6.4   )-----------( 208      ( 04 Feb 2019 13:51 )             39.6                 RADIOLOGY & ADDITIONAL STUDIES:

## 2019-02-05 NOTE — DISCHARGE NOTE ADULT - CARE PROVIDER_API CALL
Kevin Coker)  Orthopaedic Surgery  [address], [address] [place] [address]  Phone: [phone]  Fax: [phone]  Follow Up Time:

## 2019-02-05 NOTE — PROGRESS NOTE ADULT - SUBJECTIVE AND OBJECTIVE BOX
POST OPERATIVE DAY #1 right TKA   SUBJECTIVE: Patient seen and examined.  Pt without complaints.   Denies chest pain/SOB/dizziness/n/v/HA   Pain well controlled.       OBJECTIVE:     Vital Signs Last 24 Hrs  T(C): 36.8 (05 Feb 2019 08:55), Max: 36.8 (05 Feb 2019 08:55)  T(F): 98.3 (05 Feb 2019 08:55), Max: 98.3 (05 Feb 2019 08:55)  HR: 80 (05 Feb 2019 08:55) (79 - 92)  BP: 115/69 (05 Feb 2019 08:55) (115/69 - 164/85)  BP(mean): 123 (04 Feb 2019 16:45) (91 - 123)  RR: 15 (05 Feb 2019 08:55) (15 - 18)  SpO2: 98% (05 Feb 2019 08:55) (95% - 99%)    PE:          Dressing: clean/dry/intact          Sensation: intact to light touch to patient's baseline         Motor exam:  firing         Skin warm, well-perfused; capillary refill brisk             LABS:                        13.3   12.8  )-----------( 214      ( 05 Feb 2019 11:09 )             39.8     02-05    137  |  102  |  11  ----------------------------<  97  4.6   |  26  |  0.59    Ca    9.4      05 Feb 2019 11:09      ASSESSMENT AND PLAN: 68M s/p right TKA pod 1 doing well   1. labs reviewed   2. Analgesic pain control  3. DVT prophylaxis: ASA  4. Weight Bearing Status:  WBAT   5. Disposition:  home with home PT   6. Dr. Olivier following, appreciate recs

## 2019-02-05 NOTE — PROGRESS NOTE ADULT - SUBJECTIVE AND OBJECTIVE BOX
Ortho Note    Patient seen and examined earlier this morning  Pt comfortable without complaints, pain controlled  Denies CP, SOB, N/V, numbness/tingling   Pt states he refused blood draw early this AM  States he worked with PT yesterday postop    Vital Signs Last 24 Hrs  T(C): 35 (05 Feb 2019 20:32), Max: 36.8 (05 Feb 2019 08:55)  T(F): 95 (05 Feb 2019 20:32), Max: 98.3 (05 Feb 2019 08:55)  HR: 87 (05 Feb 2019 20:32) (79 - 87)  BP: 126/84 (05 Feb 2019 20:32) (111/64 - 132/76)  BP(mean): --  RR: 16 (05 Feb 2019 20:32) (15 - 17)  SpO2: 98% (05 Feb 2019 20:32) (95% - 98%)    VSS  General: A&Ox3, NAD  RLE: Cryocuff / Aquacell DSG C/D/I  Pulses: Foot WWP; DP  2+; Cap refill < 2 sec  Sensation: SILT distally and symmetric to contralateral extremity  Motor: TA/EHL/FHL/GS 5/5 and symmetric to contralateral extremity                          13.3   12.8  )-----------( 214      ( 05 Feb 2019 11:09 )             39.8   05 Feb 2019 11:09    137    |  102    |  11     ----------------------------<  97     4.6     |  26     |  0.59

## 2019-02-05 NOTE — DISCHARGE NOTE ADULT - MEDICATION SUMMARY - MEDICATIONS TO TAKE
I will START or STAY ON the medications listed below when I get home from the hospital:    aspirin 325 mg oral delayed release tablet  -- 1 tab(s) by mouth 2 times a day  -- Indication: For Prevent blood clot    celecoxib 200 mg oral capsule  -- 1 cap(s) by mouth every 12 hours  -- Indication: For Pain control    oxyCODONE 5 mg oral tablet  -- 1 tab(s) by mouth every 4 hours, As needed, Mild Pain (1 - 3)  -- Indication: For Pain control    oxyCODONE 10 mg oral tablet  -- 1 tab(s) by mouth every 4 hours, As needed, Moderate Pain (4 - 6)  -- Indication: For Pain control    KlonoPIN 0.5 mg oral tablet  -- Indication: For Home medication    lisinopril-hydrochlorothiazide 20 mg-12.5 mg oral tablet  -- 1 tab(s) by mouth once a day  -- Indication: For Home medication    docusate sodium 100 mg oral capsule  -- 1 cap(s) by mouth 3 times a day  -- Indication: For constipation    polyethylene glycol 3350 oral powder for reconstitution  -- 17 gram(s) by mouth once a day  -- Indication: For constipation    senna oral tablet  -- 2 tab(s) by mouth once a day (at bedtime), As needed, Constipation  -- Indication: For constipation

## 2019-02-05 NOTE — DISCHARGE NOTE ADULT - HOSPITAL COURSE
Admit to orthopaedic service Dr. Coker   OR  Right total knee arthroplasty on 2/4/19   Periop Antibx  DVT ppx  PT   Pain mgt

## 2019-02-05 NOTE — DISCHARGE NOTE ADULT - CARE PLAN
Principal Discharge DX:	OA (osteoarthritis)  Goal:	improve function, decrease pain  Assessment and plan of treatment:	See attached instructions from Dr. Coker  Weight bear as tolerated with assistive device  No strenuous activity, heavy lifting, driving or returning to work until cleared by MD.  You may shower - dressing is water-resistant, no soaking in bathtubs.  Remove dressing after post op day 5-7, then leave incision open to air. Keep incision clean and dry.  Try to have regular bowel movements, take stool softener or laxative if necessary.  May take Pepcid or Zantac for upset stomach.  May take Aleve or Naproxen instead of Meloxicam/Celebrex.  Swelling may travel all the way down leg to foot, this is normal and will subside in a few weeks.  Call to schedule an appt with Dr. Coker for follow up, if you have staples or sutures they will be removed in office.  Contact your doctor if you experience: fever greater than 101.5, chills, chest pain, difficulty breathing, redness or excessive drainage around the incision, other concerns.  Follow up with your primary care provider. Principal Discharge DX:	OA (osteoarthritis)  Goal:	improve function, decrease pain  Assessment and plan of treatment:	****Please see attached discharge instructions from Dr. Coker. Prescriptions have been sent to [facility], please take medications as directed by Dr. Coker****  Weight bear as tolerated with assistive device  No strenuous activity, heavy lifting, driving or returning to work until cleared by MD.  You may shower - dressing is water-resistant, no soaking in bathtubs.  Remove dressing after post op day 5-7, then leave incision open to air. Keep incision clean and dry.  Try to have regular bowel movements, take stool softener or laxative if necessary.  May take Pepcid or Zantac for upset stomach.  May take Aleve or Naproxen instead of Meloxicam/Celebrex.  Swelling may travel all the way down leg to foot, this is normal and will subside in a few weeks.  Call to schedule an appt with Dr. Coker for follow up, if you have staples or sutures they will be removed in office.  Contact your doctor if you experience: fever greater than 101.5, chills, chest pain, difficulty breathing, redness or excessive drainage around the incision, other concerns.  Follow up with your primary care provider. Principal Discharge DX:	OA (osteoarthritis)  Goal:	improve function, decrease pain  Assessment and plan of treatment:	****Please see attached discharge instructions from Dr. Coker. Prescriptions have been sent to [facility], please take medications as directed by Dr. Coker****  You will take aspirin twice a day for DVT prophylaxis for 4 weeks   Weight bear as tolerated with assistive device  No strenuous activity, heavy lifting, driving or returning to work until cleared by MD.  You may shower - dressing is water-resistant, no soaking in bathtubs.  Remove dressing after post op day 5-7, then leave incision open to air. Keep incision clean and dry.  Try to have regular bowel movements, take stool softener or laxative if necessary.  May take Pepcid or Zantac for upset stomach.  May take Aleve or Naproxen instead of Meloxicam/Celebrex.  Swelling may travel all the way down leg to foot, this is normal and will subside in a few weeks.  Call to schedule an appt with Dr. Coker for follow up, if you have staples or sutures they will be removed in office.  Contact your doctor if you experience: fever greater than 101.5, chills, chest pain, difficulty breathing, redness or excessive drainage around the incision, other concerns.  Follow up with your primary care provider.

## 2019-02-05 NOTE — DISCHARGE NOTE ADULT - PLAN OF CARE
improve function, decrease pain See attached instructions from Dr. Coker  Weight bear as tolerated with assistive device  No strenuous activity, heavy lifting, driving or returning to work until cleared by MD.  You may shower - dressing is water-resistant, no soaking in bathtubs.  Remove dressing after post op day 5-7, then leave incision open to air. Keep incision clean and dry.  Try to have regular bowel movements, take stool softener or laxative if necessary.  May take Pepcid or Zantac for upset stomach.  May take Aleve or Naproxen instead of Meloxicam/Celebrex.  Swelling may travel all the way down leg to foot, this is normal and will subside in a few weeks.  Call to schedule an appt with Dr. Coker for follow up, if you have staples or sutures they will be removed in office.  Contact your doctor if you experience: fever greater than 101.5, chills, chest pain, difficulty breathing, redness or excessive drainage around the incision, other concerns.  Follow up with your primary care provider. ****Please see attached discharge instructions from Dr. Coker. Prescriptions have been sent to [facility], please take medications as directed by Dr. Coker****  Weight bear as tolerated with assistive device  No strenuous activity, heavy lifting, driving or returning to work until cleared by MD.  You may shower - dressing is water-resistant, no soaking in bathtubs.  Remove dressing after post op day 5-7, then leave incision open to air. Keep incision clean and dry.  Try to have regular bowel movements, take stool softener or laxative if necessary.  May take Pepcid or Zantac for upset stomach.  May take Aleve or Naproxen instead of Meloxicam/Celebrex.  Swelling may travel all the way down leg to foot, this is normal and will subside in a few weeks.  Call to schedule an appt with Dr. Coker for follow up, if you have staples or sutures they will be removed in office.  Contact your doctor if you experience: fever greater than 101.5, chills, chest pain, difficulty breathing, redness or excessive drainage around the incision, other concerns.  Follow up with your primary care provider. ****Please see attached discharge instructions from Dr. Coker. Prescriptions have been sent to [facility], please take medications as directed by Dr. Coker****  You will take aspirin twice a day for DVT prophylaxis for 4 weeks   Weight bear as tolerated with assistive device  No strenuous activity, heavy lifting, driving or returning to work until cleared by MD.  You may shower - dressing is water-resistant, no soaking in bathtubs.  Remove dressing after post op day 5-7, then leave incision open to air. Keep incision clean and dry.  Try to have regular bowel movements, take stool softener or laxative if necessary.  May take Pepcid or Zantac for upset stomach.  May take Aleve or Naproxen instead of Meloxicam/Celebrex.  Swelling may travel all the way down leg to foot, this is normal and will subside in a few weeks.  Call to schedule an appt with Dr. Coker for follow up, if you have staples or sutures they will be removed in office.  Contact your doctor if you experience: fever greater than 101.5, chills, chest pain, difficulty breathing, redness or excessive drainage around the incision, other concerns.  Follow up with your primary care provider.

## 2019-02-05 NOTE — CONSULT NOTE ADULT - ASSESSMENT
68 y M with hx of HTN, prostte ca, and renal amss s/p aprtial nephrectomy who is post op from an elective right TKR    1) HTN- controlled - continue lisinopril and HCTZ  2) Insomnia- takes clonazepam prn  3) DVT ppx with asa bid dosing and mechanical SCD

## 2019-02-06 VITALS
DIASTOLIC BLOOD PRESSURE: 60 MMHG | OXYGEN SATURATION: 98 % | RESPIRATION RATE: 16 BRPM | TEMPERATURE: 98 F | SYSTOLIC BLOOD PRESSURE: 113 MMHG | HEART RATE: 86 BPM

## 2019-02-06 LAB — SURGICAL PATHOLOGY STUDY: SIGNIFICANT CHANGE UP

## 2019-02-06 PROCEDURE — 99233 SBSQ HOSP IP/OBS HIGH 50: CPT

## 2019-02-06 RX ORDER — ASPIRIN/CALCIUM CARB/MAGNESIUM 324 MG
1 TABLET ORAL
Qty: 0 | Refills: 0 | DISCHARGE
Start: 2019-02-06

## 2019-02-06 RX ORDER — OXYCODONE HYDROCHLORIDE 5 MG/1
1 TABLET ORAL
Qty: 0 | Refills: 0 | DISCHARGE
Start: 2019-02-06

## 2019-02-06 RX ORDER — POLYETHYLENE GLYCOL 3350 17 G/17G
17 POWDER, FOR SOLUTION ORAL
Qty: 0 | Refills: 0 | DISCHARGE
Start: 2019-02-06

## 2019-02-06 RX ORDER — DOCUSATE SODIUM 100 MG
1 CAPSULE ORAL
Qty: 0 | Refills: 0 | DISCHARGE
Start: 2019-02-06

## 2019-02-06 RX ORDER — SENNA PLUS 8.6 MG/1
2 TABLET ORAL
Qty: 0 | Refills: 0 | DISCHARGE
Start: 2019-02-06

## 2019-02-06 RX ORDER — CELECOXIB 200 MG/1
1 CAPSULE ORAL
Qty: 0 | Refills: 0 | DISCHARGE
Start: 2019-02-06

## 2019-02-06 RX ADMIN — OXYCODONE HYDROCHLORIDE 10 MILLIGRAM(S): 5 TABLET ORAL at 17:25

## 2019-02-06 RX ADMIN — Medication 650 MILLIGRAM(S): at 13:18

## 2019-02-06 RX ADMIN — CELECOXIB 200 MILLIGRAM(S): 200 CAPSULE ORAL at 05:22

## 2019-02-06 RX ADMIN — LISINOPRIL 20 MILLIGRAM(S): 2.5 TABLET ORAL at 05:23

## 2019-02-06 RX ADMIN — CELECOXIB 200 MILLIGRAM(S): 200 CAPSULE ORAL at 06:22

## 2019-02-06 RX ADMIN — OXYCODONE HYDROCHLORIDE 10 MILLIGRAM(S): 5 TABLET ORAL at 13:20

## 2019-02-06 RX ADMIN — OXYCODONE HYDROCHLORIDE 10 MILLIGRAM(S): 5 TABLET ORAL at 13:55

## 2019-02-06 RX ADMIN — Medication 15 MILLIGRAM(S): at 01:07

## 2019-02-06 RX ADMIN — Medication 650 MILLIGRAM(S): at 01:05

## 2019-02-06 RX ADMIN — Medication 650 MILLIGRAM(S): at 06:22

## 2019-02-06 RX ADMIN — Medication 650 MILLIGRAM(S): at 18:20

## 2019-02-06 RX ADMIN — Medication 650 MILLIGRAM(S): at 17:25

## 2019-02-06 RX ADMIN — Medication 650 MILLIGRAM(S): at 00:07

## 2019-02-06 RX ADMIN — Medication 325 MILLIGRAM(S): at 05:22

## 2019-02-06 RX ADMIN — OXYCODONE HYDROCHLORIDE 10 MILLIGRAM(S): 5 TABLET ORAL at 18:20

## 2019-02-06 RX ADMIN — OXYCODONE HYDROCHLORIDE 10 MILLIGRAM(S): 5 TABLET ORAL at 10:03

## 2019-02-06 RX ADMIN — OXYCODONE HYDROCHLORIDE 10 MILLIGRAM(S): 5 TABLET ORAL at 09:03

## 2019-02-06 RX ADMIN — Medication 650 MILLIGRAM(S): at 05:22

## 2019-02-06 RX ADMIN — CELECOXIB 200 MILLIGRAM(S): 200 CAPSULE ORAL at 17:25

## 2019-02-06 RX ADMIN — PANTOPRAZOLE SODIUM 40 MILLIGRAM(S): 20 TABLET, DELAYED RELEASE ORAL at 05:23

## 2019-02-06 RX ADMIN — CELECOXIB 200 MILLIGRAM(S): 200 CAPSULE ORAL at 18:20

## 2019-02-06 RX ADMIN — Medication 15 MILLIGRAM(S): at 00:07

## 2019-02-06 RX ADMIN — Medication 325 MILLIGRAM(S): at 17:26

## 2019-02-06 NOTE — PROGRESS NOTE ADULT - SUBJECTIVE AND OBJECTIVE BOX
Ortho Note    Pt comfortable without complaints, pain controlled  States he worked well w PT yesterday, hoping to be discharged today  Denies CP, SOB, N/V, numbness/tingling     Vital Signs Last 24 Hrs  T(C): 36.5 (06 Feb 2019 05:00), Max: 36.8 (05 Feb 2019 08:55)  T(F): 97.7 (06 Feb 2019 05:00), Max: 98.3 (05 Feb 2019 08:55)  HR: 87 (06 Feb 2019 05:00) (80 - 87)  BP: 135/76 (06 Feb 2019 05:00) (111/64 - 135/76)  BP(mean): --  RR: 17 (06 Feb 2019 05:00) (15 - 17)  SpO2: 95% (06 Feb 2019 05:00) (95% - 98%)    VSS  General: A&Ox3, NAD  RLE: Cryocuff / Aquacell DSG C/D/I  Pulses: Foot WWP; DP pulse 2+; Cap refill < 2 sec  Sensation: SILT distally and symmetric to contralateral extremity  Motor: TA/EHL/FHL/GS 5/5 and symmetric to contralateral extremity                        13.3   12.8  )-----------( 214      ( 05 Feb 2019 11:09 )             39.8   05 Feb 2019 11:09    137    |  102    |  11     ----------------------------<  97     4.6     |  26     |  0.59

## 2019-02-06 NOTE — PROGRESS NOTE ADULT - ASSESSMENT
68 y M with hx of HTN, prostate ca, and renal mass s/p partial nephrectomy who is post op from an elective right TKR    1) HTN- controlled - continue lisinopril and HCTZ  2) Insomnia- takes clonazepam prn  3) pain management / bowel regimen  4) DVT ppx with asa bid dosing and mechanical SCD

## 2019-02-06 NOTE — PROGRESS NOTE ADULT - ASSESSMENT
A/P: 68yMale POD2 s/p R TKA  - Stable  - Pain/Nausea Control adequate  - Home meds  - DVT ppx:  BID  - WBS: WBAT RLE  - PT: anticipating home w HPT; planning to clear after PM session today      Ortho Pager [phone]

## 2019-02-06 NOTE — PROGRESS NOTE ADULT - SUBJECTIVE AND OBJECTIVE BOX
INTERVAL HPI/OVERNIGHT EVENTS:  No issues overnight,  Pain controlled.     MEDICATIONS  (STANDING):  acetaminophen   Tablet .. 650 milliGRAM(s) Oral every 6 hours  aspirin enteric coated 325 milliGRAM(s) Oral two times a day  BUpivacaine liposome 1.3% Injectable (no eMAR) 20 milliLiter(s) Local Injection once  celecoxib 200 milliGRAM(s) Oral every 12 hours  docusate sodium 100 milliGRAM(s) Oral three times a day  hydrochlorothiazide 12.5 milliGRAM(s) Oral daily  lactated ringers. 1000 milliLiter(s) (100 mL/Hr) IV Continuous <Continuous>  lisinopril 20 milliGRAM(s) Oral daily  pantoprazole    Tablet 40 milliGRAM(s) Oral before breakfast  polyethylene glycol 3350 17 Gram(s) Oral daily    MEDICATIONS  (PRN):  aluminum hydroxide/magnesium hydroxide/simethicone Suspension 30 milliLiter(s) Oral four times a day PRN Indigestion  bisacodyl Suppository 10 milliGRAM(s) Rectal daily PRN If no bowel movement by postoperative day #2  clonazePAM Tablet 0.5 milliGRAM(s) Oral daily PRN anxiety  HYDROmorphone  Injectable 0.5 milliGRAM(s) IV Push every 15 minutes PRN Severe Breakthrough Pain  HYDROmorphone  Injectable 0.25 milliGRAM(s) IV Push every 4 hours PRN Severe Pain (7 - 10)  magnesium hydroxide Suspension 30 milliLiter(s) Oral daily PRN Constipation  ondansetron Injectable 4 milliGRAM(s) IV Push every 6 hours PRN Nausea and/or Vomiting  oxyCODONE    IR 5 milliGRAM(s) Oral every 4 hours PRN Mild Pain (1 - 3)  oxyCODONE    IR 10 milliGRAM(s) Oral every 4 hours PRN Moderate Pain (4 - 6)  senna 2 Tablet(s) Oral at bedtime PRN Constipation      Allergies    eggplant- coughing (Other)  No Known Drug Allergies    Intolerances        REVIEW OF SYSTEMS:  CONSTITUTIONAL:  No night sweats.  No fatigue,  No fever or chills..    RESPIRATORY:  No cough.  No wheeze.    No shortness of breath.  CARDIOVASCULAR:  No chest pains.  No palpitations.  GASTROINTESTINAL:  No abdominal pain.  No nausea or vomiting.  No diarrhea    GENITOURINARY:  No urgency.  No frequency.  No dysuria.    MUSCULOSKELETAL:  right knee pain and stiffness  SKIN:  No rashes.       T(C): 36.5 (02-06-19 @ 05:00), Max: 36.8 (02-05-19 @ 08:55)  HR: 87 (02-06-19 @ 05:00) (80 - 87)  BP: 135/76 (02-06-19 @ 05:00) (111/64 - 135/76)  RR: 17 (02-06-19 @ 05:00) (15 - 17)  SpO2: 95% (02-06-19 @ 05:00) (95% - 98%)  Wt(kg): --    PHYSICAL EXAM:   Gen: NAD AAO x3  Cardiovascular - RRR no m/r/g, no JVD  Lungs - Clear to auscultation, no use of accessory muscles, no crackles or wheezes.  Skin - No rashes, skin warm and dry, no erythematous areas  Abdomen - Normal bowel sounds, abdomen soft and nontender  Extremeties - No edema,   Neurological – no focal deficits    LABS:                        13.3   12.8  )-----------( 214      ( 05 Feb 2019 11:09 )             39.8     02-05    137  |  102  |  11  ----------------------------<  97  4.6   |  26  |  0.59    Ca    9.4      05 Feb 2019 11:09            RADIOLOGY & ADDITIONAL TESTS:

## 2019-02-08 DIAGNOSIS — M17.11 UNILATERAL PRIMARY OSTEOARTHRITIS, RIGHT KNEE: ICD-10-CM

## 2019-02-08 DIAGNOSIS — Z85.528 PERSONAL HISTORY OF OTHER MALIGNANT NEOPLASM OF KIDNEY: ICD-10-CM

## 2019-02-08 DIAGNOSIS — Z90.5 ACQUIRED ABSENCE OF KIDNEY: ICD-10-CM

## 2019-02-08 DIAGNOSIS — E66.9 OBESITY, UNSPECIFIED: ICD-10-CM

## 2019-02-08 DIAGNOSIS — I10 ESSENTIAL (PRIMARY) HYPERTENSION: ICD-10-CM

## 2019-02-08 DIAGNOSIS — Z85.46 PERSONAL HISTORY OF MALIGNANT NEOPLASM OF PROSTATE: ICD-10-CM

## 2019-02-19 ENCOUNTER — APPOINTMENT (OUTPATIENT)
Dept: ORTHOPEDIC SURGERY | Facility: CLINIC | Age: 69
End: 2019-02-19
Payer: MEDICARE

## 2019-02-19 PROBLEM — C61 MALIGNANT NEOPLASM OF PROSTATE: Chronic | Status: ACTIVE | Noted: 2019-02-01

## 2019-02-19 PROBLEM — I61.9 NONTRAUMATIC INTRACEREBRAL HEMORRHAGE, UNSPECIFIED: Chronic | Status: ACTIVE | Noted: 2019-02-01

## 2019-02-19 PROBLEM — M19.90 UNSPECIFIED OSTEOARTHRITIS, UNSPECIFIED SITE: Chronic | Status: ACTIVE | Noted: 2019-02-01

## 2019-02-19 PROBLEM — C64.9 MALIGNANT NEOPLASM OF UNSPECIFIED KIDNEY, EXCEPT RENAL PELVIS: Chronic | Status: ACTIVE | Noted: 2019-02-01

## 2019-02-19 PROCEDURE — 99024 POSTOP FOLLOW-UP VISIT: CPT

## 2019-02-19 RX ORDER — OXYCODONE HYDROCHLORIDE 10 MG/1
10 TABLET, FILM COATED, EXTENDED RELEASE ORAL
Qty: 14 | Refills: 0 | Status: DISCONTINUED | COMMUNITY
Start: 2019-02-04 | End: 2019-02-19

## 2019-02-19 RX ORDER — MORPHINE SULFATE 15 MG/1
15 TABLET, FILM COATED, EXTENDED RELEASE ORAL
Qty: 14 | Refills: 0 | Status: DISCONTINUED | COMMUNITY
Start: 2019-02-04 | End: 2019-02-19

## 2019-02-19 NOTE — ADDENDUM
[FreeTextEntry1] : This note was written by Rosalee Luong on 02/19/2019  acting as scribe for Dr. Coker and NAIMA Navarro.\par

## 2019-02-19 NOTE — HISTORY OF PRESENT ILLNESS
[Excellent Pain Control] : has excellent pain control [No Sign of Infection] : is showing no signs of infection [Slow Progress] : is progressing slowly [Chills] : no chills [Fever] : no fever [de-identified] : First post op right total knee replacement, surgical date 02/04/19 [de-identified] : 69 y/o M presents for first post op right TKR 2/4/19. He is doing well overall and is very satisfied with the surgery. Mr. Hernandez reports moderate continual right knee pain and mild right knee stiffness. He reports difficulty sleeping and therefore has been sleeping in  a recliner. Patient walks indoors only with a cane and a moderate limp. He can ascend and descend stairs using a rail.  [de-identified] : Patient is alert and oriented x3.\par Right knee: Well-healing surgical scar without erythema or ecchymosis. Acceptable post-op swelling. No instability. ROM from 5 degree to  degrees of flexion. \par Calf is soft and nontender.\par NVI. [de-identified] : No new imaging was reviewed at this time. [de-identified] : Patient is s/p right TKR and doing well overall but cannot fully straighten his right leg. I advised him to elevate the right leg with no support under the knee. This will reduce swelling and allow the knee to straighten. I told the patient not to sit in a recliner for prolonged periods of time and to sleep in bed with knee fully straight. He will begin outpatient PT and follow up in 4 weeks.\par Follow up in one month.

## 2019-02-19 NOTE — END OF VISIT
[FreeTextEntry3] : All medical record entries made by the Scribe were at my, Dr. Coker's, discretion and personally dictated by me on 02/19/2019. I have reviewed the chart and agree that the record accurately reflects my personal performance of the history, physical exam, assessment and plan. I have also personally directed, reviewed and agreed to the chart.

## 2019-02-26 PROCEDURE — S2900: CPT

## 2019-02-26 PROCEDURE — C1776: CPT

## 2019-02-26 PROCEDURE — 97530 THERAPEUTIC ACTIVITIES: CPT

## 2019-02-26 PROCEDURE — 80048 BASIC METABOLIC PNL TOTAL CA: CPT

## 2019-02-26 PROCEDURE — 73560 X-RAY EXAM OF KNEE 1 OR 2: CPT

## 2019-02-26 PROCEDURE — 88300 SURGICAL PATH GROSS: CPT

## 2019-02-26 PROCEDURE — C1713: CPT

## 2019-02-26 PROCEDURE — 85027 COMPLETE CBC AUTOMATED: CPT

## 2019-02-26 PROCEDURE — 97161 PT EVAL LOW COMPLEX 20 MIN: CPT

## 2019-02-26 PROCEDURE — 97116 GAIT TRAINING THERAPY: CPT

## 2019-02-26 PROCEDURE — 36415 COLL VENOUS BLD VENIPUNCTURE: CPT

## 2019-02-28 ENCOUNTER — TRANSCRIPTION ENCOUNTER (OUTPATIENT)
Age: 69
End: 2019-02-28

## 2019-04-15 NOTE — BEGINNING OF VISIT
[FreeTextEntry1] : pt evaluated and treated\par right foot xray reviewed: no acute fx, however questionable hair line fx noted distal aspect of the right 1st phalanx\par order right foot xray prior to coming to clinic\par continue local wound care right 1st toenail, bandage\par Right heel pain: wear supportive shoes, possible steroids injection right plantar heel upon next visit\par RTC 2-3 weeks\par pt to take an xray prior to coming to clinic next appt. [Patient] : patient

## 2019-04-17 ENCOUNTER — FORM ENCOUNTER (OUTPATIENT)
Age: 69
End: 2019-04-17

## 2019-04-18 ENCOUNTER — APPOINTMENT (OUTPATIENT)
Dept: ORTHOPEDIC SURGERY | Facility: CLINIC | Age: 69
End: 2019-04-18
Payer: MEDICARE

## 2019-04-18 ENCOUNTER — OUTPATIENT (OUTPATIENT)
Dept: OUTPATIENT SERVICES | Facility: HOSPITAL | Age: 69
LOS: 1 days | End: 2019-04-18
Payer: MEDICARE

## 2019-04-18 DIAGNOSIS — M17.11 UNILATERAL PRIMARY OSTEOARTHRITIS, RIGHT KNEE: ICD-10-CM

## 2019-04-18 DIAGNOSIS — Z96.0 PRESENCE OF UROGENITAL IMPLANTS: Chronic | ICD-10-CM

## 2019-04-18 DIAGNOSIS — Z90.5 ACQUIRED ABSENCE OF KIDNEY: Chronic | ICD-10-CM

## 2019-04-18 PROCEDURE — 99213 OFFICE O/P EST LOW 20 MIN: CPT | Mod: 24

## 2019-04-18 PROCEDURE — 73564 X-RAY EXAM KNEE 4 OR MORE: CPT

## 2019-04-18 PROCEDURE — 73564 X-RAY EXAM KNEE 4 OR MORE: CPT | Mod: 26,RT

## 2019-04-18 RX ORDER — CELECOXIB 200 MG/1
200 CAPSULE ORAL TWICE DAILY
Qty: 84 | Refills: 0 | Status: DISCONTINUED | COMMUNITY
Start: 2019-02-04 | End: 2019-04-18

## 2019-04-18 RX ORDER — ASPIRIN/ACETAMINOPHEN/CAFFEINE 500-325-65
325 POWDER IN PACKET (EA) ORAL
Qty: 60 | Refills: 0 | Status: DISCONTINUED | COMMUNITY
Start: 2019-02-04 | End: 2019-04-18

## 2019-04-18 RX ORDER — PANTOPRAZOLE 40 MG/1
40 TABLET, DELAYED RELEASE ORAL DAILY
Qty: 30 | Refills: 0 | Status: DISCONTINUED | COMMUNITY
Start: 2019-02-04 | End: 2019-04-18

## 2019-04-18 RX ORDER — OXYCODONE AND ACETAMINOPHEN 5; 325 MG/1; MG/1
5-325 TABLET ORAL
Qty: 60 | Refills: 0 | Status: DISCONTINUED | COMMUNITY
Start: 2019-02-04 | End: 2019-04-18

## 2019-04-18 RX ORDER — CLONAZEPAM 1 MG/1
1 TABLET ORAL
Refills: 0 | Status: DISCONTINUED | COMMUNITY
End: 2019-04-18

## 2019-04-18 NOTE — ADDENDUM
[FreeTextEntry1] : This note was written by Rosalee Luong on 04/18/2019  acting as scribe for Dr. Coker and NAIMA Navarro.\par

## 2019-04-18 NOTE — END OF VISIT
[FreeTextEntry3] : All medical record entries made by the Scribe were at my, Dr. Coker's, discretion and personally dictated by me on 04/18/2019. I have reviewed the chart and agree that the record accurately reflects my personal performance of the history, physical exam, assessment and plan. I have also personally directed, reviewed and agreed to the chart.

## 2019-04-18 NOTE — HISTORY OF PRESENT ILLNESS
[Doing Well] : is doing well [Healed] : healed [Excellent Pain Control] : has excellent pain control [No Sign of Infection] : is showing no signs of infection [Chills] : no chills [Fever] : no fever [de-identified] : Second post right total knee replacement, surgical date 02/04/19.  [de-identified] : 68 year old male presents for second post op s/p right TKR 2/4/19. He is doing well overall and only reports mild, occasional pain and stiffness in the right knee. He notes that the right knee is very sore after physical therapy but he is satisfied with the PT he is getting. Mr. Hernandez now c/o moderate, occasional left knee pain along with instability on uneven ground. He had a cortisone injection to the left knee with his PCP, which was tolerated well. He is interested in left TKR and would like to know when he can do this. Patient can walk less than 5 blocks with a slight limp and uses a rail to ascend and descend stairs. He has been responding to the [employer] surveys.   [de-identified] : Patient is alert and oriented x3.\par Right knee: Well-healed surgical scar without erythema or ecchymosis. Acceptable post-op swelling. No instability. ROM from full extension to 115 degrees of flexion.\par Calf is soft and nontender.\par NVI. [de-identified] : X-ray imaging of the right knee done here today shows a well-fixed right TKR in overall good alignment. [de-identified] : Mr. Hernandez is s/p right TKR and doing well overall, but is now interested in left TKR. I informed him that the soonest I will perform the surgery would be 3 months s/p the previous surgery but ultimately, it's up to the patient's discretion. I also informed him that I cannot perform surgery on a joint until at least 3 months after a joint injection. Mr. Hernandez may consider MUNGUIA injections.\par Follow up in 2 months.

## 2019-05-05 ENCOUNTER — FORM ENCOUNTER (OUTPATIENT)
Age: 69
End: 2019-05-05

## 2019-06-11 ENCOUNTER — RX RENEWAL (OUTPATIENT)
Age: 69
End: 2019-06-11

## 2019-06-17 ENCOUNTER — FORM ENCOUNTER (OUTPATIENT)
Age: 69
End: 2019-06-17

## 2019-06-18 ENCOUNTER — APPOINTMENT (OUTPATIENT)
Dept: CT IMAGING | Facility: HOSPITAL | Age: 69
End: 2019-06-18
Payer: MEDICARE

## 2019-06-18 ENCOUNTER — OUTPATIENT (OUTPATIENT)
Dept: OUTPATIENT SERVICES | Facility: HOSPITAL | Age: 69
LOS: 1 days | End: 2019-06-18
Payer: MEDICARE

## 2019-06-18 ENCOUNTER — APPOINTMENT (OUTPATIENT)
Dept: ORTHOPEDIC SURGERY | Facility: CLINIC | Age: 69
End: 2019-06-18
Payer: MEDICARE

## 2019-06-18 VITALS — WEIGHT: 215 LBS | HEIGHT: 65 IN | BODY MASS INDEX: 35.82 KG/M2

## 2019-06-18 DIAGNOSIS — Z96.0 PRESENCE OF UROGENITAL IMPLANTS: Chronic | ICD-10-CM

## 2019-06-18 DIAGNOSIS — Z90.5 ACQUIRED ABSENCE OF KIDNEY: Chronic | ICD-10-CM

## 2019-06-18 DIAGNOSIS — Z01.818 ENCOUNTER FOR OTHER PREPROCEDURAL EXAMINATION: ICD-10-CM

## 2019-06-18 LAB
ANION GAP SERPL CALC-SCNC: 11 MMOL/L — SIGNIFICANT CHANGE UP (ref 5–17)
APPEARANCE UR: CLEAR — SIGNIFICANT CHANGE UP
APTT BLD: 29.7 SEC — SIGNIFICANT CHANGE UP (ref 27.5–36.3)
BACTERIA # UR AUTO: SIGNIFICANT CHANGE UP /HPF
BILIRUB UR-MCNC: NEGATIVE — SIGNIFICANT CHANGE UP
BUN SERPL-MCNC: 16 MG/DL — SIGNIFICANT CHANGE UP (ref 7–23)
CALCIUM SERPL-MCNC: 9.6 MG/DL — SIGNIFICANT CHANGE UP (ref 8.4–10.5)
CHLORIDE SERPL-SCNC: 104 MMOL/L — SIGNIFICANT CHANGE UP (ref 96–108)
CO2 SERPL-SCNC: 28 MMOL/L — SIGNIFICANT CHANGE UP (ref 22–31)
COLOR SPEC: YELLOW — SIGNIFICANT CHANGE UP
COMMENT - URINE: SIGNIFICANT CHANGE UP
CREAT SERPL-MCNC: 0.66 MG/DL — SIGNIFICANT CHANGE UP (ref 0.5–1.3)
DIFF PNL FLD: NEGATIVE — SIGNIFICANT CHANGE UP
EPI CELLS # UR: SIGNIFICANT CHANGE UP /HPF (ref 0–5)
GLUCOSE SERPL-MCNC: 102 MG/DL — HIGH (ref 70–99)
GLUCOSE UR QL: NEGATIVE — SIGNIFICANT CHANGE UP
HBA1C BLD-MCNC: 5 % — SIGNIFICANT CHANGE UP (ref 4–5.6)
HCT VFR BLD CALC: 47 % — SIGNIFICANT CHANGE UP (ref 39–50)
HGB BLD-MCNC: 15 G/DL — SIGNIFICANT CHANGE UP (ref 13–17)
INR BLD: 0.98 — SIGNIFICANT CHANGE UP (ref 0.88–1.16)
KETONES UR-MCNC: NEGATIVE — SIGNIFICANT CHANGE UP
LEUKOCYTE ESTERASE UR-ACNC: ABNORMAL
MCHC RBC-ENTMCNC: 28.6 PG — SIGNIFICANT CHANGE UP (ref 27–34)
MCHC RBC-ENTMCNC: 31.9 GM/DL — LOW (ref 32–36)
MCV RBC AUTO: 89.5 FL — SIGNIFICANT CHANGE UP (ref 80–100)
NITRITE UR-MCNC: NEGATIVE — SIGNIFICANT CHANGE UP
NRBC # BLD: 0 /100 WBCS — SIGNIFICANT CHANGE UP (ref 0–0)
PH UR: 6 — SIGNIFICANT CHANGE UP (ref 5–8)
PLATELET # BLD AUTO: 256 K/UL — SIGNIFICANT CHANGE UP (ref 150–400)
POTASSIUM SERPL-MCNC: 4.7 MMOL/L — SIGNIFICANT CHANGE UP (ref 3.5–5.3)
POTASSIUM SERPL-SCNC: 4.7 MMOL/L — SIGNIFICANT CHANGE UP (ref 3.5–5.3)
PROT UR-MCNC: NEGATIVE MG/DL — SIGNIFICANT CHANGE UP
PROTHROM AB SERPL-ACNC: 11.1 SEC — SIGNIFICANT CHANGE UP (ref 10–12.9)
RBC # BLD: 5.25 M/UL — SIGNIFICANT CHANGE UP (ref 4.2–5.8)
RBC # FLD: 14.1 % — SIGNIFICANT CHANGE UP (ref 10.3–14.5)
RBC CASTS # UR COMP ASSIST: < 5 /HPF — SIGNIFICANT CHANGE UP
SODIUM SERPL-SCNC: 143 MMOL/L — SIGNIFICANT CHANGE UP (ref 135–145)
SP GR SPEC: 1.02 — SIGNIFICANT CHANGE UP (ref 1–1.03)
UROBILINOGEN FLD QL: 0.2 E.U./DL — SIGNIFICANT CHANGE UP
WBC # BLD: 9.18 K/UL — SIGNIFICANT CHANGE UP (ref 3.8–10.5)
WBC # FLD AUTO: 9.18 K/UL — SIGNIFICANT CHANGE UP (ref 3.8–10.5)
WBC UR QL: ABNORMAL /HPF

## 2019-06-18 PROCEDURE — 85730 THROMBOPLASTIN TIME PARTIAL: CPT

## 2019-06-18 PROCEDURE — 87086 URINE CULTURE/COLONY COUNT: CPT

## 2019-06-18 PROCEDURE — 83036 HEMOGLOBIN GLYCOSYLATED A1C: CPT

## 2019-06-18 PROCEDURE — 73700 CT LOWER EXTREMITY W/O DYE: CPT | Mod: 26,LT

## 2019-06-18 PROCEDURE — 80048 BASIC METABOLIC PNL TOTAL CA: CPT

## 2019-06-18 PROCEDURE — 85610 PROTHROMBIN TIME: CPT

## 2019-06-18 PROCEDURE — 73700 CT LOWER EXTREMITY W/O DYE: CPT

## 2019-06-18 PROCEDURE — 93005 ELECTROCARDIOGRAM TRACING: CPT

## 2019-06-18 PROCEDURE — 93010 ELECTROCARDIOGRAM REPORT: CPT | Mod: NC

## 2019-06-18 PROCEDURE — 85027 COMPLETE CBC AUTOMATED: CPT

## 2019-06-18 PROCEDURE — 99213 OFFICE O/P EST LOW 20 MIN: CPT

## 2019-06-18 PROCEDURE — 81001 URINALYSIS AUTO W/SCOPE: CPT

## 2019-06-18 NOTE — END OF VISIT
[FreeTextEntry3] : All medical record entries made by Ha Luong acting as a scribe for the performing provider (Kevin Coker MD and/or NAIMA Navarro) on 06/18/2019. All entries were dictated to me by the performing medical provider. In signing this record, the medical provider affirms that they have personally performed the history, physical exam, assessment and plan and have also directed, reviewed and agreed to the documentation in the chart.

## 2019-06-18 NOTE — REASON FOR VISIT
[Follow-Up Visit] : a follow-up visit for [FreeTextEntry2] : s/pt right total knee replacement, surgical date 02/04/19 and left knee surgery scheduled 7/8/19

## 2019-06-18 NOTE — HISTORY OF PRESENT ILLNESS
[de-identified] : 69 year old male presents today for follow up evaluation of right knee s/p right TKR 2/4/19 and for surgical consult of left knee pain. He reports mild, occasional right knee pain that he describes as a feeling of pressure on the outside of the knee. He also reports mild right knee stiffness but his right TKR is doing well overall. In the left knee he reports pain with walking and stairs as well as instability on stairs and on uneven ground. Patient can walk 5-10 blocks with a slight limp and uses a rail to ascend and descend stairs.

## 2019-06-18 NOTE — PHYSICAL EXAM
[de-identified] : No new imaging was reviewed at this time. [de-identified] : Well appearing. NAD. Alert and oriented x 3. No respiratory distress.\par Bilateral upper extremities: Skin intact. No deformity. Painless active ROM without evident restriction.\par Cervical, thoracic and lumbar spine: No visible deformity. Painless active ROM without evident restriction. No radicular pain on passive straight leg raise bilaterally.\par \par Gait: Left antalgic. \par Ambulatory assist devices: None.\par \par Bilateral Hips: No swelling or deformity. Painless and unrestricted range of motion. No crepitation.\par \par Right Knee:\par Skin intact.\par Well healed midline surgical scar.\par No erythema or ecchymosis. No swelling or effusion. No deformity. No focal tenderness.\par Painless ROM from full extension to 120 (125 with work) degrees of flexion.\par Central patellar tracking. No crepitation. Good stability.\par \par Left Knee:\par Skin intact. No surgical scars. No erythema or ecchymosis. No swelling or effusion. No deformity.\par Medial joint line tenderness.\par Painful ROM from <5 degree flexion contracture to 120 degrees of flexion.\par Central patellar tracking.\par (+) crepitation.\par No instability.\par \par Neurological: Intact distal crude touch sensation. Normal distal motor power. \par Cardiovascular: Lower extremities warm and well perfused. No peripheral edema.

## 2019-06-18 NOTE — DISCUSSION/SUMMARY
[de-identified] : Mr. Hernandez is a 70 y/o M doing well s/p right TKR. He is now interested in left TKR. Patient has progressively severe knee pain and disability secondary to DJD and has failed extensive conservative care including activity modification, analgesic medications and therapeutic exercise. The patient was indicated for left total knee replacement. The patient was given a copy of my preoperative packet with additional information about the procedure.\par \par The patient gave informed consent for the surgical procedure and was instructed to speak to my surgical coordinator to arrange the logistics of surgical scheduling, presurgical testing, and medical optimization and clearance. Patient has a tentative surgical date of 07/08/2019.\par \par

## 2019-06-19 ENCOUNTER — OUTPATIENT (OUTPATIENT)
Dept: OUTPATIENT SERVICES | Facility: HOSPITAL | Age: 69
LOS: 1 days | End: 2019-06-19

## 2019-06-19 DIAGNOSIS — Z96.0 PRESENCE OF UROGENITAL IMPLANTS: Chronic | ICD-10-CM

## 2019-06-19 DIAGNOSIS — Z22.321 CARRIER OR SUSPECTED CARRIER OF METHICILLIN SUSCEPTIBLE STAPHYLOCOCCUS AUREUS: ICD-10-CM

## 2019-06-19 DIAGNOSIS — Z90.5 ACQUIRED ABSENCE OF KIDNEY: Chronic | ICD-10-CM

## 2019-06-19 LAB
CULTURE RESULTS: NO GROWTH — SIGNIFICANT CHANGE UP
MRSA PCR RESULT.: NEGATIVE — SIGNIFICANT CHANGE UP
S AUREUS DNA NOSE QL NAA+PROBE: NEGATIVE — SIGNIFICANT CHANGE UP
SPECIMEN SOURCE: SIGNIFICANT CHANGE UP

## 2019-06-21 LAB
ANABASINE UR-MCNC: <2 NG/ML — SIGNIFICANT CHANGE UP
COTININE UR-MCNC: <5 NG/ML — SIGNIFICANT CHANGE UP
NICOTINE UR-MCNC: <5 NG/ML — SIGNIFICANT CHANGE UP
NORNICOTINE UR-MCNC: <2 NG/ML — SIGNIFICANT CHANGE UP

## 2019-07-01 ENCOUNTER — APPOINTMENT (OUTPATIENT)
Dept: INTERNAL MEDICINE | Facility: CLINIC | Age: 69
End: 2019-07-01
Payer: MEDICARE

## 2019-07-01 VITALS
DIASTOLIC BLOOD PRESSURE: 80 MMHG | SYSTOLIC BLOOD PRESSURE: 130 MMHG | BODY MASS INDEX: 35.78 KG/M2 | RESPIRATION RATE: 14 BRPM | OXYGEN SATURATION: 97 % | TEMPERATURE: 98.6 F | WEIGHT: 215 LBS | HEART RATE: 84 BPM

## 2019-07-01 DIAGNOSIS — C61 MALIGNANT NEOPLASM OF PROSTATE: ICD-10-CM

## 2019-07-01 DIAGNOSIS — Z01.818 ENCOUNTER FOR OTHER PREPROCEDURAL EXAMINATION: ICD-10-CM

## 2019-07-01 DIAGNOSIS — I10 ESSENTIAL (PRIMARY) HYPERTENSION: ICD-10-CM

## 2019-07-01 PROCEDURE — 99214 OFFICE O/P EST MOD 30 MIN: CPT

## 2019-07-01 RX ORDER — HYDROCHLOROTHIAZIDE 12.5 MG/1
12.5 TABLET ORAL DAILY
Qty: 90 | Refills: 3 | Status: ACTIVE | COMMUNITY
Start: 2019-07-01 | End: 1900-01-01

## 2019-07-01 RX ORDER — LISINOPRIL 20 MG/1
20 TABLET ORAL DAILY
Qty: 90 | Refills: 3 | Status: ACTIVE | COMMUNITY
Start: 2019-07-01 | End: 1900-01-01

## 2019-07-01 NOTE — PHYSICAL EXAM
[No Acute Distress] : no acute distress [Well Nourished] : well nourished [Well Developed] : well developed [Well-Appearing] : well-appearing [Normal Outer Ear/Nose] : the outer ears and nose were normal in appearance [Normal Oropharynx] : the oropharynx was normal [No JVD] : no jugular venous distention [Supple] : supple [No Lymphadenopathy] : no lymphadenopathy [Thyroid Normal, No Nodules] : the thyroid was normal and there were no nodules present [No Respiratory Distress] : no respiratory distress  [Clear to Auscultation] : lungs were clear to auscultation bilaterally [No Accessory Muscle Use] : no accessory muscle use [Normal Rate] : normal rate  [Regular Rhythm] : with a regular rhythm [Normal S1, S2] : normal S1 and S2 [No Edema] : there was no peripheral edema [Soft] : abdomen soft [Non Tender] : non-tender [Non-distended] : non-distended [No Masses] : no abdominal mass palpated [No HSM] : no HSM [Normal Bowel Sounds] : normal bowel sounds [Normal Posterior Cervical Nodes] : no posterior cervical lymphadenopathy [Normal Anterior Cervical Nodes] : no anterior cervical lymphadenopathy [No Rash] : no rash [Normal Gait] : normal gait [No Focal Deficits] : no focal deficits [Deep Tendon Reflexes (DTR)] : deep tendon reflexes were 2+ and symmetric [Normal Affect] : the affect was normal [Normal Insight/Judgement] : insight and judgment were intact [de-identified] : 3/6 suzanne

## 2019-07-01 NOTE — HISTORY OF PRESENT ILLNESS
[No Pertinent Cardiac History] : no history of aortic stenosis, atrial fibrillation, coronary artery disease, recent myocardial infarction, or implantable device/pacemaker [No Pertinent Pulmonary History] : no history of asthma, COPD, sleep apnea, or smoking [FreeTextEntry1] : right tkr [FreeTextEntry2] : 2/6/19 [FreeTextEntry3] : Dr Coker [FreeTextEntry4] : This is a 68 yo M with hx of HTN, prosate CA, Renal mass s/p partial nephrectomy\par Left Knee pain for 6 years - failed conservative therapy\par Hx of penile surgery- always with contaminated urine.\par Reports a hx of cerebellar hemorrhage- no trauma, resolved on its own\par Insomnia-  takes klonopin for this

## 2019-07-01 NOTE — REVIEW OF SYSTEMS
[Recent Change In Weight] : ~T recent weight change [Joint Pain] : joint pain [Insomnia] : insomnia [Negative] : Heme/Lymph [Fever] : no fever [Chills] : no chills

## 2019-07-05 RX ORDER — CLONAZEPAM 1 MG
0 TABLET ORAL
Qty: 0 | Refills: 0 | DISCHARGE

## 2019-07-08 ENCOUNTER — INPATIENT (INPATIENT)
Facility: HOSPITAL | Age: 69
LOS: 1 days | Discharge: HOME HEALTH | DRG: 470 | End: 2019-07-10
Attending: ORTHOPAEDIC SURGERY | Admitting: ORTHOPAEDIC SURGERY
Payer: MEDICARE

## 2019-07-08 ENCOUNTER — MEDICATION RENEWAL (OUTPATIENT)
Age: 69
End: 2019-07-08

## 2019-07-08 ENCOUNTER — APPOINTMENT (OUTPATIENT)
Dept: ORTHOPEDIC SURGERY | Facility: HOSPITAL | Age: 69
End: 2019-07-08

## 2019-07-08 VITALS
SYSTOLIC BLOOD PRESSURE: 143 MMHG | HEART RATE: 90 BPM | RESPIRATION RATE: 20 BRPM | OXYGEN SATURATION: 98 % | WEIGHT: 216.27 LBS | HEIGHT: 65 IN | TEMPERATURE: 98 F | DIASTOLIC BLOOD PRESSURE: 85 MMHG

## 2019-07-08 DIAGNOSIS — Z90.5 ACQUIRED ABSENCE OF KIDNEY: Chronic | ICD-10-CM

## 2019-07-08 DIAGNOSIS — M19.90 UNSPECIFIED OSTEOARTHRITIS, UNSPECIFIED SITE: ICD-10-CM

## 2019-07-08 DIAGNOSIS — C61 MALIGNANT NEOPLASM OF PROSTATE: ICD-10-CM

## 2019-07-08 DIAGNOSIS — Z96.0 PRESENCE OF UROGENITAL IMPLANTS: Chronic | ICD-10-CM

## 2019-07-08 DIAGNOSIS — I61.9 NONTRAUMATIC INTRACEREBRAL HEMORRHAGE, UNSPECIFIED: ICD-10-CM

## 2019-07-08 DIAGNOSIS — C64.9 MALIGNANT NEOPLASM OF UNSPECIFIED KIDNEY, EXCEPT RENAL PELVIS: ICD-10-CM

## 2019-07-08 LAB
BASOPHILS # BLD AUTO: 0.02 K/UL — SIGNIFICANT CHANGE UP (ref 0–0.2)
BASOPHILS NFR BLD AUTO: 0.2 % — SIGNIFICANT CHANGE UP (ref 0–2)
EOSINOPHIL # BLD AUTO: 0.01 K/UL — SIGNIFICANT CHANGE UP (ref 0–0.5)
EOSINOPHIL NFR BLD AUTO: 0.1 % — SIGNIFICANT CHANGE UP (ref 0–6)
HCT VFR BLD CALC: 44.9 % — SIGNIFICANT CHANGE UP (ref 39–50)
HGB BLD-MCNC: 14.7 G/DL — SIGNIFICANT CHANGE UP (ref 13–17)
IMM GRANULOCYTES NFR BLD AUTO: 0.6 % — SIGNIFICANT CHANGE UP (ref 0–1.5)
LYMPHOCYTES # BLD AUTO: 0.82 K/UL — LOW (ref 1–3.3)
LYMPHOCYTES # BLD AUTO: 7.8 % — LOW (ref 13–44)
MCHC RBC-ENTMCNC: 28.9 PG — SIGNIFICANT CHANGE UP (ref 27–34)
MCHC RBC-ENTMCNC: 32.7 GM/DL — SIGNIFICANT CHANGE UP (ref 32–36)
MCV RBC AUTO: 88.2 FL — SIGNIFICANT CHANGE UP (ref 80–100)
MONOCYTES # BLD AUTO: 0.37 K/UL — SIGNIFICANT CHANGE UP (ref 0–0.9)
MONOCYTES NFR BLD AUTO: 3.5 % — SIGNIFICANT CHANGE UP (ref 2–14)
NEUTROPHILS # BLD AUTO: 9.26 K/UL — HIGH (ref 1.8–7.4)
NEUTROPHILS NFR BLD AUTO: 87.8 % — HIGH (ref 43–77)
NRBC # BLD: 0 /100 WBCS — SIGNIFICANT CHANGE UP (ref 0–0)
PLATELET # BLD AUTO: 208 K/UL — SIGNIFICANT CHANGE UP (ref 150–400)
RBC # BLD: 5.09 M/UL — SIGNIFICANT CHANGE UP (ref 4.2–5.8)
RBC # FLD: 13.9 % — SIGNIFICANT CHANGE UP (ref 10.3–14.5)
WBC # BLD: 10.54 K/UL — HIGH (ref 3.8–10.5)
WBC # FLD AUTO: 10.54 K/UL — HIGH (ref 3.8–10.5)

## 2019-07-08 PROCEDURE — 27447 TOTAL KNEE ARTHROPLASTY: CPT | Mod: LT

## 2019-07-08 PROCEDURE — 73560 X-RAY EXAM OF KNEE 1 OR 2: CPT | Mod: 26,LT

## 2019-07-08 PROCEDURE — 20985 CPTR-ASST DIR MS PX: CPT

## 2019-07-08 RX ORDER — KETOROLAC TROMETHAMINE 30 MG/ML
15 SYRINGE (ML) INJECTION EVERY 6 HOURS
Refills: 0 | Status: DISCONTINUED | OUTPATIENT
Start: 2019-07-08 | End: 2019-07-09

## 2019-07-08 RX ORDER — ACETAMINOPHEN 500 MG
1000 TABLET ORAL ONCE
Refills: 0 | Status: COMPLETED | OUTPATIENT
Start: 2019-07-08 | End: 2019-07-08

## 2019-07-08 RX ORDER — POLYETHYLENE GLYCOL 3350 17 G/17G
17 POWDER, FOR SOLUTION ORAL DAILY
Refills: 0 | Status: DISCONTINUED | OUTPATIENT
Start: 2019-07-08 | End: 2019-07-10

## 2019-07-08 RX ORDER — DOCUSATE SODIUM 100 MG
100 CAPSULE ORAL THREE TIMES A DAY
Refills: 0 | Status: DISCONTINUED | OUTPATIENT
Start: 2019-07-08 | End: 2019-07-10

## 2019-07-08 RX ORDER — CEFAZOLIN SODIUM 1 G
2000 VIAL (EA) INJECTION EVERY 8 HOURS
Refills: 0 | Status: COMPLETED | OUTPATIENT
Start: 2019-07-08 | End: 2019-07-09

## 2019-07-08 RX ORDER — PANTOPRAZOLE SODIUM 20 MG/1
40 TABLET, DELAYED RELEASE ORAL
Refills: 0 | Status: DISCONTINUED | OUTPATIENT
Start: 2019-07-08 | End: 2019-07-10

## 2019-07-08 RX ORDER — HYDROCHLOROTHIAZIDE 25 MG
12.5 TABLET ORAL DAILY
Refills: 0 | Status: DISCONTINUED | OUTPATIENT
Start: 2019-07-08 | End: 2019-07-10

## 2019-07-08 RX ORDER — MORPHINE SULFATE 50 MG/1
2 CAPSULE, EXTENDED RELEASE ORAL EVERY 4 HOURS
Refills: 0 | Status: DISCONTINUED | OUTPATIENT
Start: 2019-07-08 | End: 2019-07-10

## 2019-07-08 RX ORDER — OXYCODONE HYDROCHLORIDE 5 MG/1
10 TABLET ORAL EVERY 12 HOURS
Refills: 0 | Status: DISCONTINUED | OUTPATIENT
Start: 2019-07-08 | End: 2019-07-09

## 2019-07-08 RX ORDER — OXYCODONE HYDROCHLORIDE 5 MG/1
10 TABLET ORAL EVERY 4 HOURS
Refills: 0 | Status: DISCONTINUED | OUTPATIENT
Start: 2019-07-08 | End: 2019-07-10

## 2019-07-08 RX ORDER — SENNA PLUS 8.6 MG/1
2 TABLET ORAL AT BEDTIME
Refills: 0 | Status: DISCONTINUED | OUTPATIENT
Start: 2019-07-08 | End: 2019-07-10

## 2019-07-08 RX ORDER — CEFAZOLIN SODIUM 1 G
2000 VIAL (EA) INJECTION EVERY 8 HOURS
Refills: 0 | Status: DISCONTINUED | OUTPATIENT
Start: 2019-07-08 | End: 2019-07-08

## 2019-07-08 RX ORDER — ONDANSETRON 8 MG/1
4 TABLET, FILM COATED ORAL EVERY 6 HOURS
Refills: 0 | Status: DISCONTINUED | OUTPATIENT
Start: 2019-07-08 | End: 2019-07-10

## 2019-07-08 RX ORDER — MAGNESIUM HYDROXIDE 400 MG/1
30 TABLET, CHEWABLE ORAL DAILY
Refills: 0 | Status: DISCONTINUED | OUTPATIENT
Start: 2019-07-08 | End: 2019-07-10

## 2019-07-08 RX ORDER — CLONAZEPAM 1 MG
1 TABLET ORAL DAILY
Refills: 0 | Status: DISCONTINUED | OUTPATIENT
Start: 2019-07-08 | End: 2019-07-09

## 2019-07-08 RX ORDER — DIPHENHYDRAMINE HCL 50 MG
25 CAPSULE ORAL AT BEDTIME
Refills: 0 | Status: DISCONTINUED | OUTPATIENT
Start: 2019-07-08 | End: 2019-07-10

## 2019-07-08 RX ORDER — ASPIRIN/CALCIUM CARB/MAGNESIUM 324 MG
81 TABLET ORAL
Refills: 0 | Status: DISCONTINUED | OUTPATIENT
Start: 2019-07-08 | End: 2019-07-09

## 2019-07-08 RX ORDER — CELECOXIB 200 MG/1
100 CAPSULE ORAL
Refills: 0 | Status: DISCONTINUED | OUTPATIENT
Start: 2019-07-08 | End: 2019-07-10

## 2019-07-08 RX ORDER — CELECOXIB 200 MG/1
400 CAPSULE ORAL ONCE
Refills: 0 | Status: COMPLETED | OUTPATIENT
Start: 2019-07-08 | End: 2019-07-08

## 2019-07-08 RX ORDER — LISINOPRIL 2.5 MG/1
20 TABLET ORAL DAILY
Refills: 0 | Status: DISCONTINUED | OUTPATIENT
Start: 2019-07-08 | End: 2019-07-10

## 2019-07-08 RX ORDER — OXYCODONE HYDROCHLORIDE 5 MG/1
5 TABLET ORAL EVERY 4 HOURS
Refills: 0 | Status: DISCONTINUED | OUTPATIENT
Start: 2019-07-08 | End: 2019-07-10

## 2019-07-08 RX ORDER — APREPITANT 80 MG/1
40 CAPSULE ORAL ONCE
Refills: 0 | Status: COMPLETED | OUTPATIENT
Start: 2019-07-08 | End: 2019-07-08

## 2019-07-08 RX ORDER — CHLORHEXIDINE GLUCONATE 213 G/1000ML
1 SOLUTION TOPICAL ONCE
Refills: 0 | Status: DISCONTINUED | OUTPATIENT
Start: 2019-07-08 | End: 2019-07-10

## 2019-07-08 RX ORDER — BUPIVACAINE 13.3 MG/ML
20 INJECTION, SUSPENSION, LIPOSOMAL INFILTRATION ONCE
Refills: 0 | Status: DISCONTINUED | OUTPATIENT
Start: 2019-07-08 | End: 2019-07-10

## 2019-07-08 RX ORDER — HYDROMORPHONE HYDROCHLORIDE 2 MG/ML
0.5 INJECTION INTRAMUSCULAR; INTRAVENOUS; SUBCUTANEOUS
Refills: 0 | Status: DISCONTINUED | OUTPATIENT
Start: 2019-07-08 | End: 2019-07-10

## 2019-07-08 RX ORDER — ACETAMINOPHEN 500 MG
975 TABLET ORAL EVERY 8 HOURS
Refills: 0 | Status: DISCONTINUED | OUTPATIENT
Start: 2019-07-08 | End: 2019-07-10

## 2019-07-08 RX ORDER — LISINOPRIL 2.5 MG/1
1 TABLET ORAL
Qty: 0 | Refills: 0 | DISCHARGE

## 2019-07-08 RX ORDER — ACETAMINOPHEN 500 MG
650 TABLET ORAL EVERY 6 HOURS
Refills: 0 | Status: DISCONTINUED | OUTPATIENT
Start: 2019-07-08 | End: 2019-07-10

## 2019-07-08 RX ORDER — SODIUM CHLORIDE 9 MG/ML
1000 INJECTION INTRAMUSCULAR; INTRAVENOUS; SUBCUTANEOUS
Refills: 0 | Status: DISCONTINUED | OUTPATIENT
Start: 2019-07-08 | End: 2019-07-10

## 2019-07-08 RX ADMIN — Medication 81 MILLIGRAM(S): at 17:40

## 2019-07-08 RX ADMIN — Medication 100 MILLIGRAM(S): at 21:01

## 2019-07-08 RX ADMIN — OXYCODONE HYDROCHLORIDE 10 MILLIGRAM(S): 5 TABLET ORAL at 17:40

## 2019-07-08 RX ADMIN — CELECOXIB 400 MILLIGRAM(S): 200 CAPSULE ORAL at 11:24

## 2019-07-08 RX ADMIN — Medication 2000 MILLIGRAM(S): at 20:59

## 2019-07-08 RX ADMIN — OXYCODONE HYDROCHLORIDE 10 MILLIGRAM(S): 5 TABLET ORAL at 18:45

## 2019-07-08 RX ADMIN — OXYCODONE HYDROCHLORIDE 10 MILLIGRAM(S): 5 TABLET ORAL at 19:46

## 2019-07-08 RX ADMIN — Medication 975 MILLIGRAM(S): at 21:01

## 2019-07-08 RX ADMIN — APREPITANT 40 MILLIGRAM(S): 80 CAPSULE ORAL at 11:23

## 2019-07-08 RX ADMIN — Medication 15 MILLIGRAM(S): at 18:21

## 2019-07-08 RX ADMIN — HYDROMORPHONE HYDROCHLORIDE 0.5 MILLIGRAM(S): 2 INJECTION INTRAMUSCULAR; INTRAVENOUS; SUBCUTANEOUS at 17:00

## 2019-07-08 RX ADMIN — Medication 15 MILLIGRAM(S): at 18:46

## 2019-07-08 RX ADMIN — Medication 975 MILLIGRAM(S): at 22:01

## 2019-07-08 RX ADMIN — OXYCODONE HYDROCHLORIDE 10 MILLIGRAM(S): 5 TABLET ORAL at 23:35

## 2019-07-08 RX ADMIN — Medication 1000 MILLIGRAM(S): at 11:23

## 2019-07-08 RX ADMIN — OXYCODONE HYDROCHLORIDE 10 MILLIGRAM(S): 5 TABLET ORAL at 18:16

## 2019-07-08 NOTE — H&P ADULT - PROBLEM SELECTOR PLAN 1
Admit to orthopedics.  Presents for elective L TKA robotic assisted  Medically optimized and cleared for surgery by Dr. Olivier

## 2019-07-08 NOTE — H&P ADULT - NSHPPHYSICALEXAM_GEN_ALL_CORE
General: Alert and oriented, NAD  MSK:  Decreased ROM of left knee secondary to pain.  EHL/FHL/TA/Gastro ******  DP's ****  Gross sensation to light touch intact throughout lower extremities **  Remainder of PE as per medical clearance. General: Alert and oriented, NAD  MSK:  Right TKA incision clean well healed, no signs of erythema, swelling or discharge.  Decreased ROM of left knee secondary to pain.  EHL/FHL/TA/Gastro 5/5 b/l  DP's palpable b/l, brisk cap refill b/l  Gross sensation to light touch intact throughout lower extremities b/l  Remainder of PE as per medical clearance.

## 2019-07-08 NOTE — H&P ADULT - NSICDXPASTMEDICALHX_GEN_ALL_CORE_FT
PAST MEDICAL HISTORY:  Heart Murmur     Hiatal Hernia     Malignant neoplasm of kidney     Nontraumatic intracerebral hemorrhage     OA (osteoarthritis)     Personal History of Hypertensi     Prostate cancer

## 2019-07-08 NOTE — ASU PREOP CHECKLIST - COMMENTS
dinner 22.00 hrs 7/7/19 dinner 22.00 hrs 7/7/19  16oz gatbest at )730 am. meds with sips of water in SDA

## 2019-07-08 NOTE — H&P ADULT - NSHPLABSRESULTS_GEN_ALL_CORE
Preop cbc, bmp, pt/inr, ptt, ua wnl reviewed by medical clearance.  Nasal swab negative  preop cxr wnl reviewed by medical clearance  preop ekg NSR wnl reviewed by medical clearance

## 2019-07-08 NOTE — PHYSICAL THERAPY INITIAL EVALUATION ADULT - PASSIVE RANGE OF MOTION EXAMINATION, REHAB EVAL
Right LE Passive ROM was WFL (within functional limits)/Left knee 0-90 degrees/bilateral upper extremity Passive ROM was WFL (within functional limits)

## 2019-07-08 NOTE — H&P ADULT - HISTORY OF PRESENT ILLNESS
68yo male co left knee pain x . Pt states pain began **** denying any precipitating event, and states that it is worse when ***. Pt takes *** for his/her* *** pain without relief. Pt ambulates *****. Pt has attempted and failed conservative treatment for his/her **** pain consisting of *****. *Pt not on any anticoagulation meds and denies hx of DVT*. Pt denies numbness and tingling down ** extremities b/l, fever, chills, recent illness, CP, SOB, N/V, or any other complaints.   Patient presents for elective left total knee replacement. 70yo male s/p Right TKA in february 2019 co non radiating left knee pain x 6 years. Pt states pain began slowly and progressed denying any precipitating event, and states that it is worse when walking up stairs. Pt takes ibuprofen for his left knee pain without relief. Pt ambulates independently without use of an assistive device. Pt has attempted and failed conservative treatment for his left knee pain consisting of 3 intraarticular steroid injections and PT.  Pt not on any anticoagulation meds and denies hx of DVT. Pt denies numbness and tingling down lower extremities b/l, fever, chills, recent illness, CP, SOB, N/V, or any other complaints.   Patient presents for elective left total knee replacement.

## 2019-07-08 NOTE — PHYSICAL THERAPY INITIAL EVALUATION ADULT - GAIT DEVIATIONS NOTED, PT EVAL
increased time in double stance/decreased step length/decreased stride length/decreased weight-shifting ability/decreased korin/decreased velocity of limb motion

## 2019-07-08 NOTE — PHYSICAL THERAPY INITIAL EVALUATION ADULT - ADDITIONAL COMMENTS
Pt was previously using a RW and cane in recent past 2/2 contralateral knee arthroplasty. Pt denies hx of falls, lives with wife with flight of stairs, however will be staying on main floor with only 3 steps to negotiate. Pt was independent PTA.

## 2019-07-08 NOTE — PHYSICAL THERAPY INITIAL EVALUATION ADULT - GENERAL OBSERVATIONS, REHAB EVAL
Patient received in semi-chamberlain position, no apparent distress, +cryocuff, +hep lock Patient received in semi-chamberlain position, no apparent distress, +cryocuff, +hep lock.

## 2019-07-08 NOTE — PHYSICAL THERAPY INITIAL EVALUATION ADULT - PLANNED THERAPY INTERVENTIONS, PT EVAL
Detail Level: Detailed strengthening/transfer training/gait training/ROM/balance training/bed mobility training

## 2019-07-08 NOTE — PHYSICAL THERAPY INITIAL EVALUATION ADULT - IMPAIRMENTS CONTRIBUTING TO GAIT DEVIATIONS, PT EVAL
impaired balance/impaired motor control/pain/impaired postural control/decreased ROM/decreased strength

## 2019-07-08 NOTE — PHYSICAL THERAPY INITIAL EVALUATION ADULT - IMPAIRED TRANSFERS: SIT/STAND, REHAB EVAL
impaired postural control/impaired balance/impaired motor control/pain/decreased strength/decreased ROM

## 2019-07-08 NOTE — ASU PREOP CHECKLIST - 3.
Intercerebral hemorrage 2 years ago\\\\\\\\\\\\\\\\\\\\\\\\\\\\\\\\\\\\\\\\\\\\\\\\\\\\\\\\\\\\\\\\\\\\\\\\\\\\\\\\\\\\\\\\\\\\\\\\\\\\\\\\\\\\\\\\\\\\\\\\\\\\\\\\\\\\\\\\\\\\\\\\\\\\\\\\\\\\\\\\\\\\\\\\\\\\\\\\\\\\\\\\\\\\\\\\\\\\\\\\\\\\\\\\\\\\\\\\\\\\\\\\\\\\\\\\\\\\\\\\\\\\\\\\\\\\\\\\\\\\\\\\\\\\\\\\\\\\\\\\\\\\\\\\\\\\\\\\\\\\\\\\\\\\\\\\\\\\\\\\\\\\\\\\\\\\\\\\\\\\\\\\\\\\\\\\\\\\\\\\\\\\\\\\\\\\\\\\\\\\\\\\\\\\\\\\\\\\\\\\\\\\\\\\\\\\\\\\\\\\\\\\\\\\\\\\\\\\\\\\\\\\\\\\\\\\\\\\\\\\\\\\\\\\\\\\\\\\\\\\\\\\\\\\\\\\\\\\\\\\\\\\\\\\\\\\\\\\\\\\\\\\\\\\\\\\\\\\\\\\\\\\\\\\\\\\\\\\\\\\\\\\\\\\\\\\\\\\\\\\\\\\\\\\\\\\\\\\\\\\\\\\\\\\\\\\\\\\\\\\\\\\\\\\\\\\\\\\\\\\\\\\\\\\\\\\\\\\\\\\\\\\\\\\\\\\\\\\\\\\\\\\\\\\\\\\\\\\\\\\\\\\\\\\\\\\\\\\\\\\\\\\\\\\\\\\\\\\\\\\\\\\\\\\\\\\\\\\\\\\\\\\\\\\\\\\\\\\\\\\\\\\\\\\\\\\\\\\\\\\\\\\\\\\\\\\\\\\\\\\\\\\\\\\\\\\\\\\\\\\\\\\\\\\\\\\\\\\\\\\\\\\\\\\\\\\\\\\\\\\\\\\\\\\\\\\\\\\\\\\\\\\\\\\\\\\\\\\\\\\\\\\\\\\\\\\\\\\\\\\\\\\\\\\\\\\\\\\\\\\\\\\\\\\\\\\\\\\\\\\\\\\\\\\\\\\\\\\\\\\\\\\\\\\\\\\\\\\\\\\\\\\\\\\\\\\\\\\\\\\\\\\\\\\\\\\\\\\\\\\\\\\\\\\\\\\\\\\\\\\\\\\\\\\\\\\\\\\\\\\\\\\\\\\\\\\\\\\\\\\\\\\\\\\\\\\\\\\\\\\\\\\\\\\\\\\\\\\\\\\\\\\\\\\\\\\\\\\\\\\\\\\\\\\\\\\\\\\\\\\\\\\\\\\\\\\\\\\\\\\\\\\\\\\\\\\\\\\\\\\\\\\\\\\\\\\\\\\\\\\\\\\\\\\\\\\\\\\\\\\\\\\\\\\\\\\\\\\\\\\\\\\\\\\\\\\\\\\\\\\\\\\\\\\\\\\\\\\\\\\\\\\\\\\\\\\\\\\\\\\\\\\\\\\\\\\\\\\\\\\\\\\\\\\\\\\\\\\\\\\\\\\\\\\\\\\\\\\\\\\\\\\\\\\\\\\\\\\\\\\\\\\\\\\\\\\\\\\\\\\\\\\\\\\\\\\\\\\\\\\\\\\\\\\\\\\\\\\\\\\\\\\\\\\\\\\\\\\\\\\\\\\\\\\\\\\\\\\\\\\\\\\\\\\\\\\\\\\\\\\\\\\\\\\\\\\\\\\\\\\\\\\\\\\\\\\\\\\\\\\\\\\\\\\\\\\\\\\\\\\\\\\\\\\\\\\\\\\\\\\\\\\\\\\\\\\\\\\\\\\\\\\\\\\\\\\\\\\\\\\\\\\\\\\\\\\\\\\\\\\\\\\\\\\\\\\\\\\\\\\\\\\\\\\\\\\\\\\\\\\\\\\\\\\\\\\\\\\\\\\\\\\\\\\\\\\\\\\\\\\\\\\\\\\\\\\\\\\\\\\\\\\\\\\\\\\\\\\\\\\\\\\\\\\\\\\\\\\\\\\\\\\\\\\\\\\\\\\\\\\\\\\\\\\\\\\\\\\\\\\\\\\\\\\\\\\\\\\\\\\\\\\\\\\\\\\\\\\\\\\\\\\\\\\\\\\\\\\\\\\\\\\\\\\\\\\\\\\\\\\\\\\\

## 2019-07-08 NOTE — PHYSICAL THERAPY INITIAL EVALUATION ADULT - CRITERIA FOR SKILLED THERAPEUTIC INTERVENTIONS
therapy frequency/impairments found/rehab potential/anticipated discharge recommendation/functional limitations in following categories

## 2019-07-08 NOTE — PHYSICAL THERAPY INITIAL EVALUATION ADULT - MANUAL MUSCLE TESTING RESULTS, REHAB EVAL
Except LLE grossly assessed at 2/5, however with functional ability/no strength deficits were identified

## 2019-07-09 ENCOUNTER — TRANSCRIPTION ENCOUNTER (OUTPATIENT)
Age: 69
End: 2019-07-09

## 2019-07-09 LAB
ANION GAP SERPL CALC-SCNC: 9 MMOL/L — SIGNIFICANT CHANGE UP (ref 5–17)
BUN SERPL-MCNC: 11 MG/DL — SIGNIFICANT CHANGE UP (ref 7–23)
CALCIUM SERPL-MCNC: 8.6 MG/DL — SIGNIFICANT CHANGE UP (ref 8.4–10.5)
CHLORIDE SERPL-SCNC: 100 MMOL/L — SIGNIFICANT CHANGE UP (ref 96–108)
CO2 SERPL-SCNC: 25 MMOL/L — SIGNIFICANT CHANGE UP (ref 22–31)
CREAT SERPL-MCNC: 0.59 MG/DL — SIGNIFICANT CHANGE UP (ref 0.5–1.3)
GLUCOSE SERPL-MCNC: 122 MG/DL — HIGH (ref 70–99)
HCT VFR BLD CALC: 41.3 % — SIGNIFICANT CHANGE UP (ref 39–50)
HCV AB S/CO SERPL IA: 0.1 S/CO — SIGNIFICANT CHANGE UP
HCV AB SERPL-IMP: SIGNIFICANT CHANGE UP
HGB BLD-MCNC: 13.6 G/DL — SIGNIFICANT CHANGE UP (ref 13–17)
MCHC RBC-ENTMCNC: 29.4 PG — SIGNIFICANT CHANGE UP (ref 27–34)
MCHC RBC-ENTMCNC: 32.9 GM/DL — SIGNIFICANT CHANGE UP (ref 32–36)
MCV RBC AUTO: 89.2 FL — SIGNIFICANT CHANGE UP (ref 80–100)
NRBC # BLD: 0 /100 WBCS — SIGNIFICANT CHANGE UP (ref 0–0)
PLATELET # BLD AUTO: 211 K/UL — SIGNIFICANT CHANGE UP (ref 150–400)
POTASSIUM SERPL-MCNC: 4.4 MMOL/L — SIGNIFICANT CHANGE UP (ref 3.5–5.3)
POTASSIUM SERPL-SCNC: 4.4 MMOL/L — SIGNIFICANT CHANGE UP (ref 3.5–5.3)
RBC # BLD: 4.63 M/UL — SIGNIFICANT CHANGE UP (ref 4.2–5.8)
RBC # FLD: 13.8 % — SIGNIFICANT CHANGE UP (ref 10.3–14.5)
SODIUM SERPL-SCNC: 134 MMOL/L — LOW (ref 135–145)
WBC # BLD: 12.71 K/UL — HIGH (ref 3.8–10.5)
WBC # FLD AUTO: 12.71 K/UL — HIGH (ref 3.8–10.5)

## 2019-07-09 PROCEDURE — 99233 SBSQ HOSP IP/OBS HIGH 50: CPT

## 2019-07-09 RX ORDER — PANTOPRAZOLE SODIUM 20 MG/1
1 TABLET, DELAYED RELEASE ORAL
Qty: 0 | Refills: 0 | DISCHARGE
Start: 2019-07-09

## 2019-07-09 RX ORDER — ASPIRIN/CALCIUM CARB/MAGNESIUM 324 MG
325 TABLET ORAL
Refills: 0 | Status: DISCONTINUED | OUTPATIENT
Start: 2019-07-09 | End: 2019-07-09

## 2019-07-09 RX ORDER — ASPIRIN/CALCIUM CARB/MAGNESIUM 324 MG
1 TABLET ORAL
Qty: 0 | Refills: 0 | DISCHARGE
Start: 2019-07-09

## 2019-07-09 RX ORDER — MORPHINE SULFATE 50 MG/1
1 CAPSULE, EXTENDED RELEASE ORAL
Qty: 0 | Refills: 0 | DISCHARGE
Start: 2019-07-09

## 2019-07-09 RX ORDER — POLYETHYLENE GLYCOL 3350 17 G/17G
17 POWDER, FOR SOLUTION ORAL
Qty: 0 | Refills: 0 | DISCHARGE
Start: 2019-07-09

## 2019-07-09 RX ORDER — DOCUSATE SODIUM 100 MG
1 CAPSULE ORAL
Qty: 0 | Refills: 0 | DISCHARGE
Start: 2019-07-09

## 2019-07-09 RX ORDER — CLONAZEPAM 1 MG
1 TABLET ORAL
Qty: 0 | Refills: 0 | DISCHARGE

## 2019-07-09 RX ORDER — ASPIRIN/CALCIUM CARB/MAGNESIUM 324 MG
81 TABLET ORAL
Refills: 0 | Status: DISCONTINUED | OUTPATIENT
Start: 2019-07-09 | End: 2019-07-09

## 2019-07-09 RX ORDER — KETOROLAC TROMETHAMINE 30 MG/ML
15 SYRINGE (ML) INJECTION EVERY 6 HOURS
Refills: 0 | Status: DISCONTINUED | OUTPATIENT
Start: 2019-07-09 | End: 2019-07-10

## 2019-07-09 RX ORDER — CLONAZEPAM 1 MG
1 TABLET ORAL
Qty: 0 | Refills: 0 | DISCHARGE
Start: 2019-07-09

## 2019-07-09 RX ORDER — ACETAMINOPHEN 500 MG
500 TABLET ORAL
Qty: 0 | Refills: 0 | DISCHARGE

## 2019-07-09 RX ORDER — MORPHINE SULFATE 50 MG/1
15 CAPSULE, EXTENDED RELEASE ORAL AT BEDTIME
Refills: 0 | Status: DISCONTINUED | OUTPATIENT
Start: 2019-07-09 | End: 2019-07-10

## 2019-07-09 RX ORDER — ACETAMINOPHEN 500 MG
2 TABLET ORAL
Qty: 0 | Refills: 0 | DISCHARGE
Start: 2019-07-09

## 2019-07-09 RX ORDER — CELECOXIB 200 MG/1
1 CAPSULE ORAL
Qty: 0 | Refills: 0 | DISCHARGE
Start: 2019-07-09

## 2019-07-09 RX ORDER — SENNA PLUS 8.6 MG/1
2 TABLET ORAL
Qty: 0 | Refills: 0 | DISCHARGE
Start: 2019-07-09

## 2019-07-09 RX ORDER — ASPIRIN/CALCIUM CARB/MAGNESIUM 324 MG
325 TABLET ORAL
Refills: 0 | Status: DISCONTINUED | OUTPATIENT
Start: 2019-07-09 | End: 2019-07-10

## 2019-07-09 RX ORDER — CLONAZEPAM 1 MG
1 TABLET ORAL DAILY
Refills: 0 | Status: DISCONTINUED | OUTPATIENT
Start: 2019-07-09 | End: 2019-07-10

## 2019-07-09 RX ADMIN — MORPHINE SULFATE 15 MILLIGRAM(S): 50 CAPSULE, EXTENDED RELEASE ORAL at 21:20

## 2019-07-09 RX ADMIN — Medication 975 MILLIGRAM(S): at 14:12

## 2019-07-09 RX ADMIN — Medication 30 MILLILITER(S): at 04:15

## 2019-07-09 RX ADMIN — Medication 30 MILLILITER(S): at 17:24

## 2019-07-09 RX ADMIN — OXYCODONE HYDROCHLORIDE 10 MILLIGRAM(S): 5 TABLET ORAL at 00:35

## 2019-07-09 RX ADMIN — Medication 15 MILLIGRAM(S): at 17:25

## 2019-07-09 RX ADMIN — Medication 15 MILLIGRAM(S): at 17:19

## 2019-07-09 RX ADMIN — Medication 1 TABLET(S): at 10:57

## 2019-07-09 RX ADMIN — LISINOPRIL 20 MILLIGRAM(S): 2.5 TABLET ORAL at 05:21

## 2019-07-09 RX ADMIN — Medication 100 MILLIGRAM(S): at 05:29

## 2019-07-09 RX ADMIN — Medication 30 MILLILITER(S): at 10:57

## 2019-07-09 RX ADMIN — Medication 325 MILLIGRAM(S): at 17:49

## 2019-07-09 RX ADMIN — Medication 100 MILLIGRAM(S): at 21:20

## 2019-07-09 RX ADMIN — OXYCODONE HYDROCHLORIDE 5 MILLIGRAM(S): 5 TABLET ORAL at 15:55

## 2019-07-09 RX ADMIN — OXYCODONE HYDROCHLORIDE 10 MILLIGRAM(S): 5 TABLET ORAL at 05:14

## 2019-07-09 RX ADMIN — Medication 975 MILLIGRAM(S): at 21:20

## 2019-07-09 RX ADMIN — MORPHINE SULFATE 15 MILLIGRAM(S): 50 CAPSULE, EXTENDED RELEASE ORAL at 22:20

## 2019-07-09 RX ADMIN — Medication 975 MILLIGRAM(S): at 13:18

## 2019-07-09 RX ADMIN — OXYCODONE HYDROCHLORIDE 10 MILLIGRAM(S): 5 TABLET ORAL at 07:35

## 2019-07-09 RX ADMIN — PANTOPRAZOLE SODIUM 40 MILLIGRAM(S): 20 TABLET, DELAYED RELEASE ORAL at 17:23

## 2019-07-09 RX ADMIN — Medication 2000 MILLIGRAM(S): at 05:21

## 2019-07-09 RX ADMIN — OXYCODONE HYDROCHLORIDE 10 MILLIGRAM(S): 5 TABLET ORAL at 04:14

## 2019-07-09 RX ADMIN — Medication 15 MILLIGRAM(S): at 11:12

## 2019-07-09 RX ADMIN — Medication 15 MILLIGRAM(S): at 10:57

## 2019-07-09 RX ADMIN — OXYCODONE HYDROCHLORIDE 5 MILLIGRAM(S): 5 TABLET ORAL at 15:02

## 2019-07-09 RX ADMIN — Medication 975 MILLIGRAM(S): at 05:21

## 2019-07-09 RX ADMIN — Medication 100 MILLIGRAM(S): at 13:18

## 2019-07-09 RX ADMIN — Medication 975 MILLIGRAM(S): at 06:21

## 2019-07-09 RX ADMIN — OXYCODONE HYDROCHLORIDE 10 MILLIGRAM(S): 5 TABLET ORAL at 06:35

## 2019-07-09 RX ADMIN — Medication 81 MILLIGRAM(S): at 05:21

## 2019-07-09 RX ADMIN — POLYETHYLENE GLYCOL 3350 17 GRAM(S): 17 POWDER, FOR SOLUTION ORAL at 10:57

## 2019-07-09 RX ADMIN — Medication 975 MILLIGRAM(S): at 22:20

## 2019-07-09 NOTE — DISCHARGE NOTE PROVIDER - HOSPITAL COURSE
Admitted 7/8/19    Surgery- left total knee replacement 7/8/19    Anali-op Antibiotics    Pain control    DVT prophylaxis    OOB/Physical Therapy

## 2019-07-09 NOTE — DISCHARGE NOTE PROVIDER - CARE PROVIDER_API CALL
Kevin Coker)  Orthopaedic Surgery  [address], [address] [place] [address]  Phone: [phone]  Fax: [phone]  Follow Up Time: 2 weeks

## 2019-07-09 NOTE — DISCHARGE NOTE PROVIDER - NSDCACTIVITY_GEN_ALL_CORE
No heavy lifting/straining/Do not drive or operate machinery/Showering allowed/Walking - Indoors allowed

## 2019-07-09 NOTE — PROGRESS NOTE ADULT - SUBJECTIVE AND OBJECTIVE BOX
Ortho Post Op Check    Procedure: L TKA   Surgeon: John Paul     Pt comfortable without complaints, pain controlled  Denies CP, SOB, N/V, numbness/tingling     Vital Signs Last 24 Hrs  T(C): 36.3 (07-08-19 @ 20:05), Max: 36.3 (07-08-19 @ 20:05)  T(F): 97.4 (07-08-19 @ 20:05), Max: 97.4 (07-08-19 @ 20:05)  HR: 85 (07-08-19 @ 20:05) (85 - 85)  BP: 120/63 (07-08-19 @ 20:05) (120/63 - 120/63)  BP(mean): --  RR: 17 (07-08-19 @ 20:05) (17 - 17)  SpO2: 100% (07-08-19 @ 20:05) (100% - 100%)  AVSS    General: Pt Alert and oriented, NAD  DSG C/D/I  Pulses: Dp/PT 2+  Sensation: SILT saph/sural/sp/dp/t  Motor: EHL/FHL/TA/GS                          14.7   10.54 )-----------( 208      ( 08 Jul 2019 16:57 )             44.9           Post-op X-Ray: prosthesis in place, no acute fx or dislocation     A/P: 69yMale POD#0 s/p above procedure   - Stable  - Pain Control  - DVT ppx: ASA  - Post op abx: Ancef   - PT, WBS: WBAT LLE     Ortho Pager [phone]

## 2019-07-09 NOTE — PROGRESS NOTE ADULT - SUBJECTIVE AND OBJECTIVE BOX
Ortho Note    Pt comfortable without complaints, pain controlled  Denies CP, SOB, N/V, numbness/tingling     Vital Signs Last 24 Hrs  T(C): 36.7 (07-09-19 @ 08:08), Max: 36.7 (07-09-19 @ 08:08)  T(F): 98.1 (07-09-19 @ 08:08), Max: 98.1 (07-09-19 @ 08:08)  HR: 85 (07-09-19 @ 08:08) (76 - 85)  BP: 110/69 (07-09-19 @ 08:08) (110/69 - 125/74)  BP(mean): --  RR: 17 (07-09-19 @ 08:08) (17 - 17)  SpO2: 97% (07-09-19 @ 08:08) (96% - 97%)      VSS  General: A&Ox3, NAD  LLE: Aquacell DSG C/D/I  Pulses: Foot WWP; DP pulse 2+; Cap refill < 2 sec  Sensation: SILT distally and symmetric to contralateral extremity  Motor: TA/EHL/FHL/GS 5/5 and symmetric to contralateral extremity                          13.6   12.71 )-----------( 211      ( 09 Jul 2019 06:55 )             41.3     09 Jul 2019 06:55    134    |  100    |  11     ----------------------------<  122    4.4     |  25     |  0.59     Ca    8.6        09 Jul 2019 06:55

## 2019-07-09 NOTE — DISCHARGE NOTE NURSING/CASE MANAGEMENT/SOCIAL WORK - NSDCPEPTSTRK_GEN_ALL_CORE
Risk factors for stroke/Stroke education booklet/Stroke warning signs and symptoms/Signs and symptoms of stroke/Need for follow up after discharge/Prescribed medications/Stroke support groups for patients, families, and friends/Call 911 for stroke

## 2019-07-09 NOTE — CONSULT NOTE ADULT - ASSESSMENT
70 yo M with hx of HTN, prostate CA, knee pain who is now s/p left TKR    1) HTN- acceptable post op   - continue lisinopril and HCTZ  2) DVT ppx as per ortho team with asa bid dosing  3) pain management / bowel regimen  4) f/up AM labs

## 2019-07-09 NOTE — DISCHARGE NOTE PROVIDER - NSDCCPCAREPLAN_GEN_ALL_CORE_FT
PRINCIPAL DISCHARGE DIAGNOSIS  Diagnosis: OA (osteoarthritis)  Assessment and Plan of Treatment: improvement s/p left TKR

## 2019-07-09 NOTE — PROGRESS NOTE ADULT - SUBJECTIVE AND OBJECTIVE BOX
Ortho Note    Pt comfortable without complaints, pain controlled  Denies CP, SOB, N/V, numbness/tingling     Vital Signs Last 24 Hrs  T(C): 36.7 (07-09-19 @ 08:08), Max: 36.7 (07-09-19 @ 08:08)  T(F): 98.1 (07-09-19 @ 08:08), Max: 98.1 (07-09-19 @ 08:08)  HR: 85 (07-09-19 @ 08:08) (76 - 85)  BP: 110/69 (07-09-19 @ 08:08) (110/69 - 125/74)  BP(mean): --  RR: 17 (07-09-19 @ 08:08) (17 - 17)  SpO2: 97% (07-09-19 @ 08:08) (96% - 97%)  AVSS    General: Pt Alert and oriented, NAD  DSG C/D/I  Pulses: +DP, WWP feet  Sensation:  Motor: EHL/FHL/TA/GS                          13.6   12.71 )-----------( 211      ( 09 Jul 2019 06:55 )             41.3   09 Jul 2019 06:55    134    |  100    |  11     ----------------------------<  122    4.4     |  25     |  0.59     Ca    8.6        09 Jul 2019 06:55        A/P: 69yMale POD#1 s/p left total knee replacement  - H+H stable  - Pain Control- MS contin 15mg at bedtime, oxycodone 5-10mg PRN, standing tylenol, celebrex bid  - DVT ppx: ASA bid  - PT, WBS: WBAT  - h/o partial nephrectomy- states no problems taking NSAIDS. On ASA 325bid and celebrex last admission with no issues  - continue bowel regimen  - OOB for meals, I/S  - dispo: home with home PT, tomorrow    Ortho Pager [phone] Ortho Note    Pt comfortable without complaints, pain controlled  Denies CP, SOB, N/V, numbness/tingling     Vital Signs Last 24 Hrs  T(C): 36.7 (07-09-19 @ 08:08), Max: 36.7 (07-09-19 @ 08:08)  T(F): 98.1 (07-09-19 @ 08:08), Max: 98.1 (07-09-19 @ 08:08)  HR: 85 (07-09-19 @ 08:08) (76 - 85)  BP: 110/69 (07-09-19 @ 08:08) (110/69 - 125/74)  BP(mean): --  RR: 17 (07-09-19 @ 08:08) (17 - 17)  SpO2: 97% (07-09-19 @ 08:08) (96% - 97%)  AVSS    General: Pt Alert and oriented, NAD  DSG C/D/I  Pulses: +DP, WWP feet  Sensation: SILT  Motor: 5/5 EHL/FHL/TA/GS                          13.6   12.71 )-----------( 211      ( 09 Jul 2019 06:55 )             41.3   09 Jul 2019 06:55    134    |  100    |  11     ----------------------------<  122    4.4     |  25     |  0.59     Ca    8.6        09 Jul 2019 06:55        A/P: 69yMale POD#1 s/p left total knee replacement  - H+H stable  - Pain Control- MS contin 15mg at bedtime, oxycodone 5-10mg PRN, standing tylenol, celebrex bid  - DVT ppx: ASA bid  - PT, WBS: WBAT  - h/o partial nephrectomy- states no problems taking NSAIDS. On ASA 325bid and celebrex last admission with no issues  - continue bowel regimen  - OOB for meals, I/S  - dispo: home with home PT, tomorrow    Ortho Pager [phone]

## 2019-07-09 NOTE — CONSULT NOTE ADULT - SUBJECTIVE AND OBJECTIVE BOX
Patient seen and examined, Chart reviewed    HPI:  70yo male with hx of HTN,  who is s/p Right TKA in february 2019 who now complains of non radiating left knee pain x 6 years. Pt states pain began slowly and progressed denying any precipitating event, and states that it is worse when walking up stairs. Pt takes ibuprofen for his left knee pain without relief. Pt ambulates independently without use of an assistive device. Pt has attempted and failed conservative treatment for his left knee pain consisting of 3 intraarticular steroid injections and PT.  Pt not on any anticoagulation meds and denies hx of DVT. Pt denies numbness and tingling down lower extremities b/l, fever, chills, recent illness, CP, SOB, N/V, or any other complaints.   He is now POD #1 from an elective left total knee replacement.      PAST MEDICAL & SURGICAL HISTORY:  Nontraumatic intracerebral hemorrhage  Malignant neoplasm of kidney  Prostate cancer  OA (osteoarthritis)  Heart Murmur  Hiatal Hernia  Personal History of Hypertensi  H/O partial nephrectomy: left      REVIEW OF SYSTEMS:  CONSTITUTIONAL:  No night sweats.  No fatigue,  No fever or chills.  HEENT:  Eyes:  No visual changes.    ENT:    No sinus pain.  No sore throat.  n.  RESPIRATORY:  No cough.  No wheeze.    No shortness of breath.  CARDIOVASCULAR:  No chest pains.  No palpitations.  GASTROINTESTINAL:  No abdominal pain.  No nausea or vomiting.  No diarrhea     GENITOURINARY:  No urgency.  No frequency.  No dysuria.  No hematuria.    MUSCULOSKELETAL:  minimal knee pain    SKIN:  No rashes. .  HEMATOLOGIC:  No purpura.  No petechiae.     ALLERGIC AND IMMUNOLOGIC:  No pruritus.      acetaminophen   Tablet .. 975 milliGRAM(s) Oral every 8 hours  acetaminophen   Tablet .. 650 milliGRAM(s) Oral every 6 hours PRN  aluminum hydroxide/magnesium hydroxide/simethicone Suspension 30 milliLiter(s) Oral four times a day PRN  aspirin enteric coated 81 milliGRAM(s) Oral two times a day  BUpivacaine liposome 1.3% Injectable (no eMAR) 20 milliLiter(s) Local Injection once  celecoxib 100 milliGRAM(s) Oral two times a day  chlorhexidine 2% Cloths 1 Application(s) Topical once  clonazePAM  Tablet 1 milliGRAM(s) Oral daily  diphenhydrAMINE 25 milliGRAM(s) Oral at bedtime PRN  docusate sodium 100 milliGRAM(s) Oral three times a day  hydrochlorothiazide 12.5 milliGRAM(s) Oral daily  HYDROmorphone  Injectable 0.5 milliGRAM(s) IV Push every 10 minutes PRN  ketorolac   Injectable 15 milliGRAM(s) IV Push every 6 hours PRN  lisinopril 20 milliGRAM(s) Oral daily  magnesium hydroxide Suspension 30 milliLiter(s) Oral daily PRN  morphine  - Injectable 2 milliGRAM(s) IV Push every 4 hours PRN  multivitamin 1 Tablet(s) Oral daily  ondansetron Injectable 4 milliGRAM(s) IV Push every 6 hours PRN  oxyCODONE    IR 5 milliGRAM(s) Oral every 4 hours PRN  oxyCODONE    IR 10 milliGRAM(s) Oral every 4 hours PRN  oxyCODONE  ER Tablet 10 milliGRAM(s) Oral every 12 hours  pantoprazole    Tablet 40 milliGRAM(s) Oral before breakfast  polyethylene glycol 3350 17 Gram(s) Oral daily  senna 2 Tablet(s) Oral at bedtime PRN  sodium chloride 0.9%. 1000 milliLiter(s) IV Continuous <Continuous>      Allergies    eggplant- coughing (Other)  No Known Drug Allergies      SOCIAL HISTORY: no tob    FAMILY HISTORY:  nc    Vital Signs Last 24 Hrs  T(C): 35.7 (09 Jul 2019 05:20), Max: 36.9 (08 Jul 2019 09:42)  T(F): 96.3 (09 Jul 2019 05:20), Max: 98.5 (08 Jul 2019 09:42)  HR: 76 (09 Jul 2019 05:20) (76 - 96)  BP: 125/74 (09 Jul 2019 05:20) (113/66 - 143/85)  BP(mean): 95 (08 Jul 2019 16:53) (84 - 95)  RR: 17 (09 Jul 2019 05:20) (8 - 23)  SpO2: 96% (09 Jul 2019 05:20) (95% - 100%)    07-08 @ 07:01  -  07-09 @ 07:00  --------------------------------------------------------  IN: 150 mL / OUT: 500 mL / NET: -350 mL        PHYSICAL EXAM:   General - NAD, Alert and oriented x 3,   Neck - - No lymphadenopathy  Cardiovascular - RRR  2/6 suzanne, no JVD  Lungs - Clear to auscultation, no use of accessory muscles, no crackles or wheezes.  Skin - No rashes, skin warm and dry  Abdomen - Normal bowel sounds, abdomen soft and nontender  Extremeties - No edema,  Neurological – no focal deficits      LABS:                        13.6   12.71 )-----------( 211      ( 09 Jul 2019 06:55 )             41.3                 RADIOLOGY & ADDITIONAL STUDIES:

## 2019-07-10 VITALS
HEART RATE: 84 BPM | TEMPERATURE: 97 F | DIASTOLIC BLOOD PRESSURE: 73 MMHG | SYSTOLIC BLOOD PRESSURE: 113 MMHG | OXYGEN SATURATION: 96 % | RESPIRATION RATE: 17 BRPM

## 2019-07-10 PROCEDURE — 85027 COMPLETE CBC AUTOMATED: CPT

## 2019-07-10 PROCEDURE — 99233 SBSQ HOSP IP/OBS HIGH 50: CPT

## 2019-07-10 PROCEDURE — 97161 PT EVAL LOW COMPLEX 20 MIN: CPT

## 2019-07-10 PROCEDURE — 80048 BASIC METABOLIC PNL TOTAL CA: CPT

## 2019-07-10 PROCEDURE — 36415 COLL VENOUS BLD VENIPUNCTURE: CPT

## 2019-07-10 PROCEDURE — 73560 X-RAY EXAM OF KNEE 1 OR 2: CPT

## 2019-07-10 PROCEDURE — 97116 GAIT TRAINING THERAPY: CPT

## 2019-07-10 PROCEDURE — 86803 HEPATITIS C AB TEST: CPT

## 2019-07-10 PROCEDURE — C1776: CPT

## 2019-07-10 PROCEDURE — S2900: CPT

## 2019-07-10 PROCEDURE — C1713: CPT

## 2019-07-10 PROCEDURE — 85025 COMPLETE CBC W/AUTO DIFF WBC: CPT

## 2019-07-10 RX ORDER — OXYCODONE HYDROCHLORIDE 5 MG/1
5 TABLET ORAL ONCE
Refills: 0 | Status: DISCONTINUED | OUTPATIENT
Start: 2019-07-10 | End: 2019-07-10

## 2019-07-10 RX ADMIN — Medication 100 MILLIGRAM(S): at 05:09

## 2019-07-10 RX ADMIN — Medication 975 MILLIGRAM(S): at 13:14

## 2019-07-10 RX ADMIN — Medication 325 MILLIGRAM(S): at 05:09

## 2019-07-10 RX ADMIN — OXYCODONE HYDROCHLORIDE 10 MILLIGRAM(S): 5 TABLET ORAL at 13:14

## 2019-07-10 RX ADMIN — Medication 15 MILLIGRAM(S): at 00:17

## 2019-07-10 RX ADMIN — OXYCODONE HYDROCHLORIDE 5 MILLIGRAM(S): 5 TABLET ORAL at 08:49

## 2019-07-10 RX ADMIN — Medication 975 MILLIGRAM(S): at 05:09

## 2019-07-10 RX ADMIN — Medication 15 MILLIGRAM(S): at 00:00

## 2019-07-10 RX ADMIN — PANTOPRAZOLE SODIUM 40 MILLIGRAM(S): 20 TABLET, DELAYED RELEASE ORAL at 05:09

## 2019-07-10 RX ADMIN — Medication 975 MILLIGRAM(S): at 06:09

## 2019-07-10 RX ADMIN — OXYCODONE HYDROCHLORIDE 5 MILLIGRAM(S): 5 TABLET ORAL at 09:30

## 2019-07-10 RX ADMIN — CELECOXIB 100 MILLIGRAM(S): 200 CAPSULE ORAL at 05:09

## 2019-07-10 RX ADMIN — CELECOXIB 100 MILLIGRAM(S): 200 CAPSULE ORAL at 06:09

## 2019-07-10 NOTE — PROGRESS NOTE ADULT - SUBJECTIVE AND OBJECTIVE BOX
INTERVAL HPI/OVERNIGHT EVENTS:  Feels well, pain controlled, no issues overnight.    MEDICATIONS  (STANDING):  acetaminophen   Tablet .. 975 milliGRAM(s) Oral every 8 hours  aspirin enteric coated 325 milliGRAM(s) Oral two times a day  BUpivacaine liposome 1.3% Injectable (no eMAR) 20 milliLiter(s) Local Injection once  celecoxib 100 milliGRAM(s) Oral two times a day  chlorhexidine 2% Cloths 1 Application(s) Topical once  docusate sodium 100 milliGRAM(s) Oral three times a day  hydrochlorothiazide 12.5 milliGRAM(s) Oral daily  lisinopril 20 milliGRAM(s) Oral daily  morphine ER Tablet 15 milliGRAM(s) Oral at bedtime  multivitamin 1 Tablet(s) Oral daily  pantoprazole    Tablet 40 milliGRAM(s) Oral before breakfast  polyethylene glycol 3350 17 Gram(s) Oral daily  sodium chloride 0.9%. 1000 milliLiter(s) (75 mL/Hr) IV Continuous <Continuous>    MEDICATIONS  (PRN):  acetaminophen   Tablet .. 650 milliGRAM(s) Oral every 6 hours PRN Temp greater or equal to 38.5C (101.3F)  aluminum hydroxide/magnesium hydroxide/simethicone Suspension 30 milliLiter(s) Oral four times a day PRN Indigestion  bisacodyl Suppository 10 milliGRAM(s) Rectal daily PRN If no bowel movement by postoperative day #2  clonazePAM  Tablet 1 milliGRAM(s) Oral daily PRN anxiety  diphenhydrAMINE 25 milliGRAM(s) Oral at bedtime PRN Insomnia  HYDROmorphone  Injectable 0.5 milliGRAM(s) IV Push every 10 minutes PRN Severe Pain (7 - 10)  magnesium hydroxide Suspension 30 milliLiter(s) Oral daily PRN Constipation  morphine  - Injectable 2 milliGRAM(s) IV Push every 4 hours PRN Severe Pain (7 - 10)  ondansetron Injectable 4 milliGRAM(s) IV Push every 6 hours PRN Nausea and/or Vomiting  oxyCODONE    IR 5 milliGRAM(s) Oral every 4 hours PRN Mild Pain (1 - 3)  oxyCODONE    IR 10 milliGRAM(s) Oral every 4 hours PRN Moderate Pain (4 - 6)  senna 2 Tablet(s) Oral at bedtime PRN Constipation      Allergies    eggplant- coughing (Other)  No Known Drug Allergies      REVIEW OF SYSTEMS:  CONSTITUTIONAL:  No night sweats.  No fatigue,  No fever or chills.  HEENT:  Eyes:  No visual changes.    ENT:    No sinus pain.  No sore throat.  n.  RESPIRATORY:  No cough.  No wheeze.    No shortness of breath.  CARDIOVASCULAR:  No chest pains.  No palpitations.  GASTROINTESTINAL:  No abdominal pain.  No nausea or vomiting.  No diarrhea     GENITOURINARY:  No urgency.  No frequency.  No dysuria.  No hematuria.    MUSCULOSKELETAL:  minimal knee pain    SKIN:  No rashes. .    T(C): 36.5 (07-10-19 @ 04:30), Max: 36.7 (07-09-19 @ 08:08)  HR: 79 (07-10-19 @ 04:30) (79 - 88)  BP: 102/68 (07-10-19 @ 04:30) (102/61 - 131/78)  RR: 17 (07-10-19 @ 04:30) (16 - 17)  SpO2: 96% (07-10-19 @ 04:30) (95% - 97%)  Wt(kg): --    PHYSICAL EXAM:   General - NAD, Alert and oriented x 3,   Neck - - No lymphadenopathy  Cardiovascular - RRR  2/6 suzanne, no JVD  Lungs - Clear to auscultation, no use of accessory muscles, no crackles or wheezes.  Skin - No rashes, skin warm and dry  Abdomen - Normal bowel sounds, abdomen soft and nontender  Extremeties - No edema,  Neurological – no focal deficits    LABS:                        13.6   12.71 )-----------( 211      ( 09 Jul 2019 06:55 )             41.3     07-09    134<L>  |  100  |  11  ----------------------------<  122<H>  4.4   |  25  |  0.59    Ca    8.6      09 Jul 2019 06:55            RADIOLOGY & ADDITIONAL TESTS:

## 2019-07-10 NOTE — PROGRESS NOTE ADULT - SUBJECTIVE AND OBJECTIVE BOX
Ortho Note    Pt comfortable without complaints, pain controlled  Denies CP, SOB, N/V, numbness/tingling     Vital Signs Last 24 Hrs  T(C): 36.3 (10 Jul 2019 08:20), Max: 36.6 (09 Jul 2019 16:37)  T(F): 97.4 (10 Jul 2019 08:20), Max: 97.9 (09 Jul 2019 16:37)  HR: 84 (10 Jul 2019 08:20) (79 - 88)  BP: 113/73 (10 Jul 2019 08:20) (102/61 - 131/78)  BP(mean): --  RR: 17 (10 Jul 2019 08:20) (16 - 17)  SpO2: 96% (10 Jul 2019 08:20) (95% - 96%)      VSS  General: A&Ox3, NAD  LLE: Aquacell DSG C/D/I  Pulses: Foot WWP; DP pulse 2+; Cap refill < 2 sec  Sensation: SILT distally and symmetric to contralateral extremity  Motor: TA/EHL/FHL/GS 5/5 and symmetric to contralateral extremity                          13.6   12.71 )-----------( 211      ( 09 Jul 2019 06:55 )             41.3     09 Jul 2019 06:55    134    |  100    |  11     ----------------------------<  122    4.4     |  25     |  0.59

## 2019-07-10 NOTE — PROGRESS NOTE ADULT - ASSESSMENT
68 yo M with hx of HTN, prostate CA, knee pain who is now s/p left TKR    1) HTN- has been low, but pt remains asymptomatic   - continue lisinopril and HCTZ  2) DVT ppx as per ortho team with asa bid dosing  3) pain management / bowel regimen  4) OOB with PT this AM
A/P: 69yMale s/p L TKA  - Stable  - Pain/Nausea Control - Half dose celebrex to 100 BID  - Home meds  - AM labs unremarkable  - DVT ppx: ASA 81 BID  - WBS: WBAT LLE  - PT: home w HPT  - Likely d/c tomorrow      Ortho Pager [phone]
A/P: 69yMale s/p L TKA  - Stable  - Pain/Nausea Control - Half dose celebrex to 100 BID  - Home meds  - DVT ppx:  BID  - WBS: WBAT LLE  - PT: home w HPT  - Likely d/c today      Ortho Pager [phone]

## 2019-07-15 DIAGNOSIS — M21.162 VARUS DEFORMITY, NOT ELSEWHERE CLASSIFIED, LEFT KNEE: ICD-10-CM

## 2019-07-15 DIAGNOSIS — Z85.46 PERSONAL HISTORY OF MALIGNANT NEOPLASM OF PROSTATE: ICD-10-CM

## 2019-07-15 DIAGNOSIS — M17.12 UNILATERAL PRIMARY OSTEOARTHRITIS, LEFT KNEE: ICD-10-CM

## 2019-07-15 DIAGNOSIS — E66.9 OBESITY, UNSPECIFIED: ICD-10-CM

## 2019-07-15 DIAGNOSIS — M25.762 OSTEOPHYTE, LEFT KNEE: ICD-10-CM

## 2019-07-15 DIAGNOSIS — K21.9 GASTRO-ESOPHAGEAL REFLUX DISEASE WITHOUT ESOPHAGITIS: ICD-10-CM

## 2019-07-15 DIAGNOSIS — I10 ESSENTIAL (PRIMARY) HYPERTENSION: ICD-10-CM

## 2019-07-15 DIAGNOSIS — Z92.3 PERSONAL HISTORY OF IRRADIATION: ICD-10-CM

## 2019-07-15 DIAGNOSIS — Z90.5 ACQUIRED ABSENCE OF KIDNEY: ICD-10-CM

## 2019-07-15 DIAGNOSIS — Z85.528 PERSONAL HISTORY OF OTHER MALIGNANT NEOPLASM OF KIDNEY: ICD-10-CM

## 2019-07-18 ENCOUNTER — MEDICATION RENEWAL (OUTPATIENT)
Age: 69
End: 2019-07-18

## 2019-07-23 ENCOUNTER — APPOINTMENT (OUTPATIENT)
Dept: ORTHOPEDIC SURGERY | Facility: CLINIC | Age: 69
End: 2019-07-23
Payer: MEDICARE

## 2019-07-23 PROCEDURE — 99024 POSTOP FOLLOW-UP VISIT: CPT

## 2019-07-23 RX ORDER — OXYCODONE AND ACETAMINOPHEN 5; 325 MG/1; MG/1
5-325 TABLET ORAL
Qty: 50 | Refills: 0 | Status: DISCONTINUED | COMMUNITY
Start: 2019-07-08 | End: 2019-07-23

## 2019-07-23 RX ORDER — OXYCODONE HYDROCHLORIDE 10 MG/1
10 TABLET, FILM COATED, EXTENDED RELEASE ORAL
Qty: 14 | Refills: 0 | Status: DISCONTINUED | COMMUNITY
Start: 2019-07-08 | End: 2019-07-23

## 2019-07-23 RX ORDER — MORPHINE SULFATE 15 MG/1
15 TABLET, FILM COATED, EXTENDED RELEASE ORAL
Qty: 14 | Refills: 0 | Status: DISCONTINUED | COMMUNITY
Start: 2019-07-08 | End: 2019-07-23

## 2019-07-23 NOTE — ADDENDUM
[FreeTextEntry1] : Dr. Coker was physically present during the key portions the encounter, provided assistance as needed, and formulated the assessment and plan as documented above.\par \par

## 2019-07-23 NOTE — HISTORY OF PRESENT ILLNESS
[___ Weeks Post Op] : [unfilled] weeks post op [Doing Well] : is doing well [Excellent Pain Control] : has excellent pain control [No Sign of Infection] : is showing no signs of infection [de-identified] : Patient presents for first post op visit s/p left TKA 7/8/19. He states he is doing well overall. Pain is well controlled. He notes stiffness with sitting for short periods, otherwise has very little pain. He is taking ASA daily for DVT ppx. He has finished home PT and is ready to begin outpatient. He is able to ambulate unassisted but uses a cane when outdoors,  [de-identified] : First post op left total knee replacement  [de-identified] : Patient is alert and oriented x3.\par Left knee: Well-healed surgical scar without erythema or ecchymosis. Acceptable post op swelling. No laxity noted. ROM from full extension to 100 degrees of flexion. Calf soft and nontender. NVI. Quad power 5/5.  [de-identified] : No new images taken today [de-identified] : Patient is progressing well 2 weeks s/p L TKA. Advised to begin outpatient PT and gradually wean off cane. Follow up in 4-6 weeks.

## 2019-08-26 ENCOUNTER — FORM ENCOUNTER (OUTPATIENT)
Age: 69
End: 2019-08-26

## 2019-08-27 ENCOUNTER — APPOINTMENT (OUTPATIENT)
Dept: ORTHOPEDIC SURGERY | Facility: CLINIC | Age: 69
End: 2019-08-27
Payer: MEDICARE

## 2019-08-27 ENCOUNTER — OUTPATIENT (OUTPATIENT)
Dept: OUTPATIENT SERVICES | Facility: HOSPITAL | Age: 69
LOS: 1 days | End: 2019-08-27
Payer: MEDICARE

## 2019-08-27 DIAGNOSIS — M17.12 UNILATERAL PRIMARY OSTEOARTHRITIS, LEFT KNEE: ICD-10-CM

## 2019-08-27 DIAGNOSIS — Z90.5 ACQUIRED ABSENCE OF KIDNEY: Chronic | ICD-10-CM

## 2019-08-27 DIAGNOSIS — E66.9 OBESITY, UNSPECIFIED: ICD-10-CM

## 2019-08-27 DIAGNOSIS — Z96.0 PRESENCE OF UROGENITAL IMPLANTS: Chronic | ICD-10-CM

## 2019-08-27 PROCEDURE — 73564 X-RAY EXAM KNEE 4 OR MORE: CPT

## 2019-08-27 PROCEDURE — 99024 POSTOP FOLLOW-UP VISIT: CPT

## 2019-08-27 PROCEDURE — 73564 X-RAY EXAM KNEE 4 OR MORE: CPT | Mod: 26,LT

## 2019-08-27 RX ORDER — CELECOXIB 100 MG/1
100 CAPSULE ORAL TWICE DAILY
Qty: 84 | Refills: 0 | Status: DISCONTINUED | COMMUNITY
Start: 2019-07-08 | End: 2019-08-27

## 2019-08-27 RX ORDER — PANTOPRAZOLE 40 MG/1
40 TABLET, DELAYED RELEASE ORAL DAILY
Qty: 30 | Refills: 0 | Status: DISCONTINUED | COMMUNITY
Start: 2019-07-08 | End: 2019-08-27

## 2019-08-27 RX ORDER — ASPIRIN/ACETAMINOPHEN/CAFFEINE 500-325-65
325 POWDER IN PACKET (EA) ORAL
Qty: 60 | Refills: 0 | Status: DISCONTINUED | COMMUNITY
Start: 2019-07-08 | End: 2019-08-27

## 2019-08-27 NOTE — END OF VISIT
[FreeTextEntry3] : All medical record entries made by Ha Luong acting as a scribe for the performing provider (Kevin Coker MD and/or NAIMA Navarro) on 08/27/2019. All entries were dictated to me by the performing medical provider. In signing this record, the medical provider affirms that they have personally performed the history, physical exam, assessment and plan and have also directed, reviewed and agreed to the documentation in the chart.

## 2019-08-27 NOTE — HISTORY OF PRESENT ILLNESS
[Clean/Dry/Intact] : clean, dry and intact [Healed] : healed [Doing Well] : is doing well [No Sign of Infection] : is showing no signs of infection [Excellent Pain Control] : has excellent pain control [Chills] : no chills [de-identified] : 69 year old male presents for second post op s/p left TKR 7/8/19. He is doing well overall. He reports mild, occasional bilateral knee pain and mild stiffness. Patient can walk 5-10 blocks unassisted. He uses a rail to ascend and descend stairs. He has been working with physical therapy. [de-identified] : second post op left total knee replacement, surgical date 7/8/19 [Fever] : no fever [de-identified] : X-ray imaging of the left knee done here today shows a well-fixed left TKR in overall good alignment. [de-identified] : Patient is alert and oriented x3.\par Left knee: Well-healed surgical scar without erythema or ecchymosis. Acceptable post-op swelling. No instability. ROM from full extension to 125 (130 with work) degrees of flexion.\par Calf is soft and nontender.\par NVI.\par \par Right Knee:\par Skin intact. (+) Well healed surgical scar. No erythema or ecchymosis. No swelling or effusion. No deformity. No focal tenderness.\par Painless ROM from full extension to 120-125 degrees of flexion.\par Central patellar tracking. No crepitation. No instability.  [de-identified] : Mr. MOSS is a 69 year old M doing well s/p left TKR 7/8/19. He should continue working with physical therapy. I encouraged patient to lose weight for his overall health and in order to reduce stress on his knee replacements. I referred patient to a nutritionist.\par Follow up in 2 months.

## 2019-10-06 ENCOUNTER — FORM ENCOUNTER (OUTPATIENT)
Age: 69
End: 2019-10-06

## 2019-10-29 ENCOUNTER — APPOINTMENT (OUTPATIENT)
Dept: ORTHOPEDIC SURGERY | Facility: CLINIC | Age: 69
End: 2019-10-29

## 2019-11-12 ENCOUNTER — APPOINTMENT (OUTPATIENT)
Dept: ORTHOPEDIC SURGERY | Facility: CLINIC | Age: 69
End: 2019-11-12
Payer: MEDICARE

## 2019-11-12 VITALS — HEIGHT: 65 IN | BODY MASS INDEX: 35.82 KG/M2 | WEIGHT: 215 LBS

## 2019-11-12 DIAGNOSIS — Z96.652 AFTERCARE FOLLOWING JOINT REPLACEMENT SURGERY: ICD-10-CM

## 2019-11-12 DIAGNOSIS — Z47.1 AFTERCARE FOLLOWING JOINT REPLACEMENT SURGERY: ICD-10-CM

## 2019-11-12 DIAGNOSIS — Z96.651 AFTERCARE FOLLOWING JOINT REPLACEMENT SURGERY: ICD-10-CM

## 2019-11-12 PROCEDURE — 99213 OFFICE O/P EST LOW 20 MIN: CPT

## 2019-11-12 NOTE — END OF VISIT
[FreeTextEntry3] : All medical record entries made by Ha Luong acting as a scribe for the performing provider (Kevin Coker MD and/or NAIMA Navarro) on 11/12/2019. All entries were dictated to me by the performing medical provider. In signing this record, the medical provider affirms that they have personally performed the history, physical exam, assessment and plan and have also directed, reviewed and agreed to the documentation in the chart.

## 2019-11-12 NOTE — PHYSICAL EXAM
[de-identified] : Overweight but well appearing. NAD. Alert and oriented x 3. No respiratory distress.\par Bilateral upper extremities: Skin intact. No deformity. Painless active ROM without evident restriction.\par Cervical, thoracic and lumbar spine: No visible deformity. Painless active ROM without evident restriction. No radicular pain on passive straight leg raise bilaterally.\par \par Gait: Normal.\par Ambulatory assist devices: None.\par \par Bilateral Hips: No swelling or deformity. No crepitation. (+) Minimal pain with ROM of left hip.\par \par Right Knee:\par Skin intact. (+) Well healed midline surgical scar. No erythema or ecchymosis. No swelling or effusion. No deformity. No focal tenderness.\par Painless ROM from full extension to 120 degrees of flexion.\par Central patellar tracking. No crepitation. No instability.\par \par Left Knee:\par Skin intact.(+) Well healed midline surgical scar. No erythema or ecchymosis. No swelling or effusion. No deformity. No focal tenderness.\par Painless ROM from full extension to 120 degrees of flexion.\par Central patellar tracking. No crepitation. Good stability.\par \par Neurological: Intact distal crude touch sensation. Normal distal motor power. \par Cardiovascular: Lower extremities warm and well perfused. No peripheral edema. [de-identified] : No new imaging was reviewed at this time.

## 2019-11-12 NOTE — HISTORY OF PRESENT ILLNESS
[de-identified] : 69 year old M presents today for follow up evaluation about 4 months s/p left TKR 7/8/19 and s/p right TKR 2/4/19. He is doing well overall. He does not report any left knee pain but reports mild bilateral knee stiffness. Patient can walk an unlimited distance unassisted. He ascends stairs normally and descends them with a rail.

## 2019-11-12 NOTE — DISCUSSION/SUMMARY
[de-identified] : Mr. Hernandez is doing well s/p left TKR 7/8/19 and right TKR 2/4/19. I encouraged him to lose weight for his overall health. I gave patient information on antibiotic use prior to dental work after joint replacement. \par Follow up one year s/p left TKR operation or sooner, PRN.

## 2019-11-18 ENCOUNTER — FORM ENCOUNTER (OUTPATIENT)
Age: 69
End: 2019-11-18

## 2020-01-09 NOTE — PHYSICAL THERAPY INITIAL EVALUATION ADULT - IMPAIRMENTS FOUND, PT EVAL
ROM/muscle strength/gait, locomotion, and balance

## 2021-07-22 NOTE — DISCUSSION/SUMMARY
[de-identified] : 69 y/o M with HTN presents today for bilateral knee pain, pain in right > left. X-ray imaging of the bilateral knees taken here today shows severe osteoarthritis of both knees, bone on bone in medial tibiofemoral compartment, more severe on the right than the left, significant patellofemoral involvement, bone on bone bilaterally, and lesser degenerative changes in the lateral compartment bilaterally.\par \par Patient has progressively severe knee pain and disability secondary to DJD and has failed extensive conservative care including activity modification, analgesic medications and therapeutic exercise. The patient was indicated for right total knee replacement.\par We discussed the details of the procedure, the expected recovery period, and the expected outcome. We discussed the likelihood of satisfaction after complete recovery, and the potential causes of dissatisfaction. The importance of active patient participation in the rehabilitation protocol was emphasized, along with its influence on short and long-term outcomes. Specific risks of total knee replacement were discussed in detail. We discussed the risk of surgical site complications including but not limited to: surgical site infection, wound healing complications, bone fracture, tendon or ligament injury, neurovascular injury, hemorrhage, postoperative stiffness or instability, persistent pain and need for reoperation or manipulation under anesthesia. We discussed surgical blood loss and the possible need for blood transfusion. We discussed the risk of perioperative medical complications, including but not limited to catheter-associated urinary tract infection, venous thromboembolism and other cardiopulmonary complications. We discussed anesthetic options and the risk of anesthesia-related complications. We discussed the durability of prosthetic knees and limitations related to wear, osteolysis and loosening. The patient was given a copy of my preoperative packet with additional information about the procedure.\par \par I advised the patient to get in contact with the neurologist that he saw regarding his brain bleed to discuss which medications he can take during and post surgery. \par \par Follow up to schedule surgery. no concerns

## 2023-02-20 NOTE — DISCHARGE NOTE PROVIDER - NSDCFUADDINST_GEN_ALL_CORE_FT
**Please see Dr. Coker's separate discharge instruction sheet. Your medications were sent to [facility], located on the first floor of [facility]. Take medications as prescribed.  _________________________________  Weight bear as tolerated with assistive device.  No strenuous activity, heavy lifting, driving or returning to work until cleared by MD.  You may shower - dressing is water-resistant, no soaking in bathtubs.  Remove dressing after post op day 5-7, then leave incision open to air. Keep incision clean and dry.  Try to have regular bowel movements, take stool softener or laxative if necessary.  May take Pepcid or Zantac for upset stomach.  Swelling may travel all the way down leg to foot, this is normal and will subside in a few weeks.  Call to schedule an appt with Dr. Coker for follow up, if you have staples or sutures they will be removed in office.  Contact your doctor if you experience: fever greater than 101.5, chills, chest pain, difficulty breathing, redness or excessive drainage around the incision, other concerns.  Follow up with your primary care provider. Provider (A): DEAN Ferrer

## 2023-09-15 ASSESSMENT — KOOS JR
IMPORTED KOOS JR SCORE: 59.38
KOOS JR RAW SCORE: 4
IMPORTED FORM: YES
IMPORTED LATERALITY: LEFT
RISING FROM SITTING: MILD
GOING UP OR DOWN STAIRS: MILD
GOING UP OR DOWN STAIRS: MILD
IMPORTED KOOS JR SCORE: 70.7
BENDING TO THE FLOOR TO PICK UP OBJECT: MODERATE
KOOS JR RAW SCORE: 3
BENDING TO THE FLOOR TO PICK UP OBJECT: MILD
IMPORTED FORM: YES
HOW SEVERE IS YOUR KNEE STIFFNESS AFTER FIRST WAKING IN MORNING: MILD
STANDING UPRIGHT: MODERATE
IMPORTED LATERALITY: RIGHT
GOING UP OR DOWN STAIRS: SEVERE
GOING UP OR DOWN STAIRS: SEVERE
RISING FROM SITTING: MILD
RISING FROM SITTING: MODERATE
IMPORTED KOOS JR SCORE: 70.7
GOING UP OR DOWN STAIRS: MODERATE
KOOS JR RAW SCORE: 11
HOW SEVERE IS YOUR KNEE STIFFNESS AFTER FIRST WAKING IN MORNING: MILD
KOOS JR RAW SCORE: 6
KOOS JR RAW SCORE: 6
TWISING OR PIVOTING ON KNEE: MILD
IMPORTED KOOS JR SCORE: 65.99
IMPORTED KOOS JR SCORE: 79.91
GOING UP OR DOWN STAIRS: MODERATE
KOOS JR RAW SCORE: 15
IMPORTED KOOS JR SCORE: 76.33
TWISING OR PIVOTING ON KNEE: SEVERE
IMPORTED KOOS JR SCORE: 50.01
TWISING OR PIVOTING ON KNEE: MILD
BENDING TO THE FLOOR TO PICK UP OBJECT: SEVERE
HOW SEVERE IS YOUR KNEE STIFFNESS AFTER FIRST WAKING IN MORNING: MODERATE
KOOS JR RAW SCORE: 8
TWISING OR PIVOTING ON KNEE: MODERATE
BENDING TO THE FLOOR TO PICK UP OBJECT: MILD
STANDING UPRIGHT: MODERATE
RISING FROM SITTING: MODERATE
KOOS JR RAW SCORE: 5
TWISING OR PIVOTING ON KNEE: MODERATE
IMPORTED KOOS JR SCORE: 73.34

## 2024-11-26 NOTE — DISCHARGE NOTE PROVIDER - NSDCADMDATE_GEN_ALL_CORE_FT
08-Jul-2019 10:16
Alert-The patient is alert, awake and responds to voice. The patient is oriented to time, place, and person. The triage nurse is able to obtain subjective information.

## 2025-07-15 NOTE — ASU PREOP CHECKLIST - BP NONINVASIVE SYSTOLIC (MM HG)
Is The Wound New Or Recurrent?: new How Severe Is It?: moderate How Is Your Wound Healing?: healing slowly Is This A New Presentation, Or A Follow-Up?: Wound 143